# Patient Record
Sex: FEMALE | Race: WHITE | Employment: OTHER | URBAN - METROPOLITAN AREA
[De-identification: names, ages, dates, MRNs, and addresses within clinical notes are randomized per-mention and may not be internally consistent; named-entity substitution may affect disease eponyms.]

---

## 2019-07-16 LAB
CREATININE, EXTERNAL: 0.9
LDL-C, EXTERNAL: 157

## 2020-09-09 DIAGNOSIS — Z00.00 PREVENTATIVE HEALTH CARE: Primary | ICD-10-CM

## 2020-09-10 RX ORDER — THYROID, PORCINE 90 MG/1
TABLET ORAL
Qty: 30 TAB | Refills: 0 | Status: SHIPPED | OUTPATIENT
Start: 2020-09-10 | End: 2020-12-03

## 2020-09-10 RX ORDER — ROPINIROLE 3 MG/1
TABLET, FILM COATED ORAL
Qty: 30 TAB | Refills: 0 | Status: SHIPPED | OUTPATIENT
Start: 2020-09-10 | End: 2020-12-03

## 2020-09-22 VITALS
HEIGHT: 61 IN | SYSTOLIC BLOOD PRESSURE: 158 MMHG | TEMPERATURE: 98.4 F | WEIGHT: 186 LBS | DIASTOLIC BLOOD PRESSURE: 82 MMHG | OXYGEN SATURATION: 98 % | HEART RATE: 71 BPM | BODY MASS INDEX: 35.12 KG/M2 | RESPIRATION RATE: 12 BRPM

## 2020-09-22 PROBLEM — Z88.2 ALLERGY STATUS TO SULFONAMIDES STATUS: Status: ACTIVE | Noted: 2020-09-22

## 2020-09-22 PROBLEM — R06.02 SHORTNESS OF BREATH: Status: ACTIVE | Noted: 2020-09-22

## 2020-09-22 PROBLEM — R00.2 PALPITATIONS: Status: ACTIVE | Noted: 2020-09-22

## 2020-09-22 PROBLEM — K21.9 GASTRO-ESOPHAGEAL REFLUX DISEASE WITHOUT ESOPHAGITIS: Status: ACTIVE | Noted: 2020-09-22

## 2020-09-22 PROBLEM — R06.00 DYSPNEA, UNSPECIFIED: Status: ACTIVE | Noted: 2020-09-22

## 2020-09-22 PROBLEM — E07.9 DISORDER OF THYROID, UNSPECIFIED: Status: ACTIVE | Noted: 2020-09-22

## 2020-09-22 PROBLEM — I42.2 OTHER HYPERTROPHIC CARDIOMYOPATHY (HCC): Status: ACTIVE | Noted: 2020-09-22

## 2020-09-24 ENCOUNTER — VIRTUAL VISIT (OUTPATIENT)
Dept: FAMILY MEDICINE CLINIC | Age: 60
End: 2020-09-24
Payer: COMMERCIAL

## 2020-09-24 DIAGNOSIS — E78.2 MIXED HYPERLIPIDEMIA: ICD-10-CM

## 2020-09-24 DIAGNOSIS — E03.9 ACQUIRED HYPOTHYROIDISM: ICD-10-CM

## 2020-09-24 DIAGNOSIS — Z13.220 SCREENING FOR LIPOID DISORDERS: ICD-10-CM

## 2020-09-24 DIAGNOSIS — I10 ESSENTIAL HYPERTENSION: Primary | ICD-10-CM

## 2020-09-24 DIAGNOSIS — K21.9 GASTRO-ESOPHAGEAL REFLUX DISEASE WITHOUT ESOPHAGITIS: ICD-10-CM

## 2020-09-24 PROCEDURE — 99214 OFFICE O/P EST MOD 30 MIN: CPT | Performed by: FAMILY MEDICINE

## 2020-09-24 RX ORDER — PANTOPRAZOLE SODIUM 40 MG/1
TABLET, DELAYED RELEASE ORAL
Qty: 30 TAB | Refills: 1 | Status: SHIPPED | OUTPATIENT
Start: 2020-09-24 | End: 2020-12-03

## 2020-09-24 RX ORDER — ARMODAFINIL 150 MG/1
1 TABLET ORAL DAILY
COMMUNITY
End: 2022-10-17 | Stop reason: ALTCHOICE

## 2020-09-24 NOTE — PROGRESS NOTES
Consent: Octavio Armstrong, who was seen by synchronous (real-time) audio-video technology, and/or her healthcare decision maker, is aware that this patient-initiated, Telehealth encounter on 9/24/2020 is a billable service, with coverage as determined by her insurance carrier. She is aware that she may receive a bill and has provided verbal consent to proceed: YES-Consent obtained within past 12 months        712  Subjective:   Octavio Armstrong is a 64 y.o. female who was seen for Cholesterol Problem and Labs      She is following up today on hypertension, mixed hyperlipidemia, and hypothyroid histories. She reports taking her medications as directed. She continues to see the cardiologist every 6 months and states that her recent visit had a normal blood pressure and normal EKG. No medication changes were made. She also states that she has been following with Dr. Minor Graham for her fatigue and sleep issues. She use the CPAP machine for a few months but was not having any symptomatic improvement. He tested her for narcolepsy but that was negative. He then put her on armodafinil and she states that it made a tremendous difference in her energy levels. She will be seeing him again shortly. She also notes that the chest pain she has been having was negative for any cardiac work-up and her cardiologist told her to start Pepcid. She had stopped the Protonix at my encouragement to minimize risks of long-term use. Since starting the Pepcid she really has not noticed much change in the chest pain but does admit that she was having definite heartburn at night and it is helped a bit with that. Review of Systems   Constitutional: Negative for chills, fever and malaise/fatigue. Respiratory: Negative for cough, shortness of breath and wheezing. Cardiovascular: Positive for chest pain. Negative for palpitations and leg swelling. Gastrointestinal: Negative for diarrhea and vomiting.    Genitourinary: Negative for dysuria. Musculoskeletal: Negative for falls. Skin: Negative for rash. Neurological: Negative for dizziness, tingling and weakness. Psychiatric/Behavioral: Negative for depression. The patient is not nervous/anxious. Prior to Admission medications    Medication Sig Start Date End Date Taking? Authorizing Provider   Armodafinil 150 mg tab Take 1 Tab by mouth daily. Yes Provider, Historical   pantoprazole (Protonix) 40 mg tablet 1 tab po x 2-4 wks then prn 9/24/20  Yes MD Amanda Vinson Thyroid 90 mg tablet TAKE ONE TABLET BY MOUTH EVERY DAY IN THE MORNING 9/10/20  Yes Robert Aldrich MD   rOPINIRole (REQUIP) 3 mg tab tab TAKE One tablet by mouth at bedtime 9/10/20  Yes Robert Aldrich MD   metoprolol succinate (TOPROL-XL) 50 mg XL tablet Take  by mouth daily. Yes Provider, Historical   disopyramide CR (NORPACE CR) 100 mg CR capsule Take 2 Caps by mouth two (2) times a day. 10/2/13  Yes Reagna Dykes MD   Levocetirizine (XYZAL) 5 mg tablet Take  by mouth daily. Yes Provider, Historical   TOLTERODINE TARTRATE (DETROL PO) Take  by mouth. Yes Provider, Historical   losartan (COZAAR) 50 mg tablet Take 1 Tab by mouth daily. 11/1/13 9/24/20  Reagan Dykes MD   pantoprazole (PROTONIX) 40 mg tablet Take 40 mg by mouth daily. 9/24/20  Provider, Historical     Allergies   Allergen Reactions    Buspirone Other (comments)     Dizziness and fatigue    Sulfa (Sulfonamide Antibiotics) Itching     Patient Active Problem List    Diagnosis    Disorder of thyroid, unspecified    Gastro-esophageal reflux disease without esophagitis    Other hypertrophic cardiomyopathy (HCC)    Palpitations    Dyspnea, unspecified    Shortness of breath    Depressive disorder    Heart murmur    Acquired hypothyroidism    Mixed hyperlipidemia    Urinary incontinence, mixed    Restless legs    Obstructive sleep apnea     Intolerant of masks, had tried many      Obesity (BMI 30.0-34. 9)    Syncope and collapse     Syncope on 11/7/13. Witnessed by daughter.  Left ventricular outflow tract obstruction     MARYANNE of the anterior MV leaflet in the absence of significant septal hyptertrophy      Essential hypertension    Other dyspnea and respiratory abnormality       Objective:   Vital Signs: (As obtained by patient/caregiver at home)  There were no vitals taken for this visit. [INSTRUCTIONS:  \"[x]\" Indicates a positive item  \"[]\" Indicates a negative item  -- DELETE ALL ITEMS NOT EXAMINED]    Constitutional: [x] Appears well-developed and well-nourished [x] No apparent distress      [] Abnormal -     Mental status: [x] Alert and awake  [x] Oriented to person/place/time [x] Able to follow commands    [] Abnormal -     Eyes:   EOM    [x]  Normal    [] Abnormal -   Sclera  [x]  Normal    [] Abnormal -          Discharge [x]  None visible   [] Abnormal -     HENT: [x] Normocephalic, atraumatic  [] Abnormal -   [x] Mouth/Throat: Mucous membranes are moist    External Ears [x] Normal  [] Abnormal -    Neck: [x] No visualized mass [] Abnormal -     Pulmonary/Chest: [x] Respiratory effort normal   [x] No visualized signs of difficulty breathing or respiratory distress        [] Abnormal -        Neurological:        [x] No Facial Asymmetry (Cranial nerve 7 motor function) (limited exam due to video visit)          [x] No gaze palsy        [] Abnormal -          Skin:        [x] No significant exanthematous lesions or discoloration noted on facial skin         [] Abnormal -            Psychiatric:       [x] Normal Affect [] Abnormal -        [x] No Hallucinations    Other pertinent observable physical exam findings:-              Assessment & Plan:   Diagnoses and all orders for this visit:    1. Essential hypertension  Patient reports normal blood pressure readings both at home and at her recent cardiology visit. No medication change.     2. Mixed hyperlipidemia  -     LIPID PANEL  -     METABOLIC PANEL, COMPREHENSIVE  -     CBC WITH AUTOMATED DIFF  Patient is currently not on medication for the cholesterol levels. She does report that she recently came off vacation but is starting to work more on healthy lifestyle again. We are checking annual levels. 3. Acquired hypothyroidism  -     THYROID CASCADE PROFILE  Patient reports taking medication daily as directed. We are checking annual level with labs. 4. Gastro-esophageal reflux disease without esophagitis  I am going to put her back on her Protonix for the next 2 to 4 weeks to see if that resolves this obscure chest pain she has been having that has had negative cardiac work-up. We reviewed coming back off of the medicine onto Pepcid if things go well and trying to then use the Protonix only on an as-needed basis. 5. Screening for lipoid disorders    Other orders  -     pantoprazole (Protonix) 40 mg tablet; 1 tab po x 2-4 wks then prn          Follow-up and Dispositions    · Return in about 6 months (around 3/24/2021) for wellness. non fasting. We discussed the expected course, resolution and complications of the diagnosis(es) in detail. Medication risks, benefits, costs, interactions, and alternatives were discussed as indicated. I advised her to contact the office if her condition worsens, changes or fails to improve as anticipated. She expressed understanding with the diagnosis(es) and plan. Rashad Domingo is a 64 y.o. female being evaluated by a video visit encounter for concerns as above. A caregiver was present when appropriate. Due to this being a TeleHealth encounter (During ZKEHU-76 public health emergency), evaluation of the following organ systems was limited: Vitals/Constitutional/EENT/Resp/CV/GI//MS/Neuro/Skin/Heme-Lymph-Imm.   Pursuant to the emergency declaration under the [facility]5 waiver authority and the [employer] Appropriations Act, this Virtual  Visit was conducted, with patient's (and/or legal guardian's) consent, to reduce the patient's risk of exposure to COVID-19 and provide necessary medical care. Services were provided through a video synchronous discussion virtually to substitute for in-person clinic visit. Patient and provider were located at their individual homes.         Joanie Coronado MD

## 2020-09-30 LAB
ALBUMIN SERPL-MCNC: 4.8 G/DL (ref 3.8–4.9)
ALBUMIN/GLOB SERPL: 1.9 {RATIO} (ref 1.2–2.2)
ALP SERPL-CCNC: 80 IU/L (ref 39–117)
ALT SERPL-CCNC: 24 IU/L (ref 0–32)
AST SERPL-CCNC: 17 IU/L (ref 0–40)
BASOPHILS # BLD AUTO: 0.1 X10E3/UL (ref 0–0.2)
BASOPHILS NFR BLD AUTO: 1 %
BILIRUB SERPL-MCNC: 0.2 MG/DL (ref 0–1.2)
BUN SERPL-MCNC: 21 MG/DL (ref 6–24)
BUN/CREAT SERPL: 24 (ref 9–23)
CALCIUM SERPL-MCNC: 10 MG/DL (ref 8.7–10.2)
CHLORIDE SERPL-SCNC: 100 MMOL/L (ref 96–106)
CHOLEST SERPL-MCNC: 230 MG/DL (ref 100–199)
CO2 SERPL-SCNC: 25 MMOL/L (ref 20–29)
CREAT SERPL-MCNC: 0.88 MG/DL (ref 0.57–1)
EOSINOPHIL # BLD AUTO: 0.1 X10E3/UL (ref 0–0.4)
EOSINOPHIL NFR BLD AUTO: 2 %
ERYTHROCYTE [DISTWIDTH] IN BLOOD BY AUTOMATED COUNT: 13.1 % (ref 11.7–15.4)
GLOBULIN SER CALC-MCNC: 2.5 G/DL (ref 1.5–4.5)
GLUCOSE SERPL-MCNC: 98 MG/DL (ref 65–99)
HCT VFR BLD AUTO: 38.9 % (ref 34–46.6)
HDLC SERPL-MCNC: 53 MG/DL
HGB BLD-MCNC: 13.3 G/DL (ref 11.1–15.9)
IMM GRANULOCYTES # BLD AUTO: 0 X10E3/UL (ref 0–0.1)
IMM GRANULOCYTES NFR BLD AUTO: 1 %
LDLC SERPL CALC-MCNC: 128 MG/DL (ref 0–99)
LYMPHOCYTES # BLD AUTO: 2.2 X10E3/UL (ref 0.7–3.1)
LYMPHOCYTES NFR BLD AUTO: 40 %
MCH RBC QN AUTO: 30.6 PG (ref 26.6–33)
MCHC RBC AUTO-ENTMCNC: 34.2 G/DL (ref 31.5–35.7)
MCV RBC AUTO: 89 FL (ref 79–97)
MONOCYTES # BLD AUTO: 0.4 X10E3/UL (ref 0.1–0.9)
MONOCYTES NFR BLD AUTO: 7 %
NEUTROPHILS # BLD AUTO: 2.8 X10E3/UL (ref 1.4–7)
NEUTROPHILS NFR BLD AUTO: 49 %
PLATELET # BLD AUTO: 276 X10E3/UL (ref 150–450)
POTASSIUM SERPL-SCNC: 4.8 MMOL/L (ref 3.5–5.2)
PROT SERPL-MCNC: 7.3 G/DL (ref 6–8.5)
RBC # BLD AUTO: 4.35 X10E6/UL (ref 3.77–5.28)
SODIUM SERPL-SCNC: 138 MMOL/L (ref 134–144)
TRIGL SERPL-MCNC: 278 MG/DL (ref 0–149)
TSH SERPL DL<=0.005 MIU/L-ACNC: 4.24 UIU/ML (ref 0.45–4.5)
VLDLC SERPL CALC-MCNC: 49 MG/DL (ref 5–40)
WBC # BLD AUTO: 5.6 X10E3/UL (ref 3.4–10.8)

## 2020-10-21 ENCOUNTER — DOCUMENTATION ONLY (OUTPATIENT)
Dept: FAMILY MEDICINE CLINIC | Age: 60
End: 2020-10-21

## 2020-10-27 ENCOUNTER — TELEPHONE (OUTPATIENT)
Dept: FAMILY MEDICINE CLINIC | Age: 60
End: 2020-10-27

## 2020-10-27 NOTE — TELEPHONE ENCOUNTER
Patient called to f/u to see if the form she dropped off for [employer] has been signed and faxed yet, she has not received a call.

## 2020-12-07 RX ORDER — METOPROLOL SUCCINATE 50 MG/1
50 TABLET, EXTENDED RELEASE ORAL DAILY
Qty: 90 TAB | Refills: 1 | Status: SHIPPED | OUTPATIENT
Start: 2020-12-07 | End: 2022-10-17 | Stop reason: SDUPTHER

## 2021-03-29 ENCOUNTER — OFFICE VISIT (OUTPATIENT)
Dept: FAMILY MEDICINE CLINIC | Age: 61
End: 2021-03-29
Payer: COMMERCIAL

## 2021-03-29 VITALS
HEIGHT: 61 IN | WEIGHT: 178 LBS | BODY MASS INDEX: 33.61 KG/M2 | SYSTOLIC BLOOD PRESSURE: 126 MMHG | OXYGEN SATURATION: 96 % | DIASTOLIC BLOOD PRESSURE: 82 MMHG | HEART RATE: 72 BPM | TEMPERATURE: 96.9 F

## 2021-03-29 DIAGNOSIS — Z00.00 WELLNESS EXAMINATION: Primary | ICD-10-CM

## 2021-03-29 DIAGNOSIS — E66.09 CLASS 1 OBESITY DUE TO EXCESS CALORIES WITH SERIOUS COMORBIDITY AND BODY MASS INDEX (BMI) OF 34.0 TO 34.9 IN ADULT: ICD-10-CM

## 2021-03-29 DIAGNOSIS — I10 ESSENTIAL HYPERTENSION: ICD-10-CM

## 2021-03-29 PROCEDURE — 99396 PREV VISIT EST AGE 40-64: CPT | Performed by: FAMILY MEDICINE

## 2021-03-29 NOTE — PROGRESS NOTES
Subjective  Chief Complaint   Patient presents with    Annual Wellness Visit     HPI:  Adiel Avila is a 63 y.o. female. Adiel Avila is on the schedule today for annual wellness exam and is also due for follow-up of chronic issues. For the wellness:   Flu- Recommended every fall  Tetanus- Tdap 2013  Shingrix- series completed  Pneumovax 23-  N/A  Prevnar 15- at age 74  HCV screening- complete  Pap- 3/18/2021  Mammo- 5/12/2020  Dexa- at age 74  Colon cancer screening- colonoscopy 2014  Smoking status- never  Low dose CT scan- N/A  PHQ2 Score = 0  Exercise- walking    For the acute/chronic issues:  She is also due for 6-month follow-up on hypertension. She reports taking medication as directed. Home blood pressures at goal per pt.        Past Medical History:   Diagnosis Date    Acquired hypothyroidism     Cancer ([facility] [address]     skin    Depressive disorder     Essential hypertension     Eye problems     Heart murmur     Mixed hyperlipidemia     Restless legs     Thyroid disease     Urinary incontinence, mixed      Family History   Problem Relation Age of Onset    Heart Surgery Father         CABG x3    Hypertension Father     High Cholesterol Father     Heart Disease Father     Bipolar Disorder Father     Coronary Artery Disease Paternal Grandfather     Heart Disease Paternal Grandfather     High Cholesterol Sister     Hypertension Sister     Depression Mother     Cancer Mother         lung    Diabetes Maternal Grandmother     Heart Disease Maternal Grandfather      Social History     Socioeconomic History    Marital status:      Spouse name: Not on file    Number of children: Not on file    Years of education: Not on file    Highest education level: Not on file   Occupational History    Not on file   Social Needs    Financial resource strain: Not on file    Food insecurity     Worry: Not on file     Inability: Not on file    Transportation needs     Medical: Not on file Non-medical: Not on file   Tobacco Use    Smoking status: Never Smoker    Smokeless tobacco: Never Used   Substance and Sexual Activity    Alcohol use: No    Drug use: Never    Sexual activity: Not on file   Lifestyle    Physical activity     Days per week: Not on file     Minutes per session: Not on file    Stress: Not on file   Relationships    Social connections     Talks on phone: Not on file     Gets together: Not on file     Attends [employer] service: Not on file     Active member of club or organization: Not on file     Attends meetings of clubs or organizations: Not on file     Relationship status: Not on file    Intimate partner violence     Fear of current or ex partner: Not on file     Emotionally abused: Not on file     Physically abused: Not on file     Forced sexual activity: Not on file   Other Topics Concern    Not on file   Social History Narrative    Not on file     Current Outpatient Medications on File Prior to Visit   Medication Sig Dispense Refill    mirabegron (MYRBETRIQ PO) Take  by mouth. 1 tablet po daily -patient trying medication samples to see if it helps      pantoprazole (PROTONIX) 40 mg tablet TAKE ONE TABLET BY MOUTH DAILY FOR 2 TO 4 WEEKS THEN TAKE ONLY AS NEEDED 90 Tab 0    metoprolol succinate (TOPROL-XL) 50 mg XL tablet Take 1 Tab by mouth daily. 90 Tab 1    FLUoxetine (PROzac) 20 mg capsule Take 1 capsule(s) every day by oral route. 90 Cap 3    [place] Thyroid 90 mg tablet TAKE ONE TABLET BY MOUTH EVERY DAY IN THE MORNING 90 Tab 3    rOPINIRole (REQUIP) 3 mg tab tab TAKE One tablet by mouth at bedtime 90 Tab 3    Armodafinil 150 mg tab Take 1 Tab by mouth daily.  disopyramide CR (NORPACE CR) 100 mg CR capsule Take 2 Caps by mouth two (2) times a day. 120 Cap 3    Levocetirizine (XYZAL) 5 mg tablet Take  by mouth daily.  TOLTERODINE TARTRATE (DETROL PO) Take  by mouth. No current facility-administered medications on file prior to visit. Allergies   Allergen Reactions    Buspirone Other (comments)     Dizziness and fatigue    Sulfa (Sulfonamide Antibiotics) Itching     Review of Systems   Constitutional: Negative for chills, fever and malaise/fatigue. Respiratory: Negative for cough, shortness of breath and wheezing. Cardiovascular: Negative for chest pain, palpitations and leg swelling. Gastrointestinal: Negative for diarrhea and vomiting. Genitourinary: Negative for dysuria. Neurological: Negative for dizziness, tingling and weakness. Psychiatric/Behavioral: Negative for depression. The patient is not nervous/anxious. Objective  Visit Vitals  /82 (BP 1 Location: Left upper arm, BP Patient Position: At rest, BP Cuff Size: Adult)   Pulse 72   Temp 96.9 °F (36.1 °C) (Temporal)   Ht 5' 0.5\" (1.537 m)   Wt 178 lb (80.7 kg)   SpO2 96%   BMI 34.19 kg/m²     Physical Exam  Constitutional:       General: She is not in acute distress. Appearance: Normal appearance. She is obese. HENT:      Head: Normocephalic and atraumatic. Neck:      Musculoskeletal: Neck supple. No muscular tenderness. Thyroid: No thyroid mass or thyromegaly. Cardiovascular:      Rate and Rhythm: Normal rate and regular rhythm. Heart sounds: Murmur present. Pulmonary:      Effort: Pulmonary effort is normal. No respiratory distress. Breath sounds: Normal breath sounds. No wheezing. Musculoskeletal:      Right lower leg: No edema. Left lower leg: No edema. Lymphadenopathy:      Cervical: No cervical adenopathy. Skin:     General: Skin is warm and dry. Neurological:      Mental Status: She is alert and oriented to person, place, and time. Mental status is at baseline. Psychiatric:         Attention and Perception: Attention and perception normal.         Mood and Affect: Mood and affect normal.         Speech: Speech normal.         Behavior: Behavior normal.          Assessment & Plan    ICD-10-CM ICD-9-CM    1. Wellness examination  Z00.00 V70.0    2. Essential hypertension  I10 401.9    3. Class 1 obesity due to excess calories with serious comorbidity and body mass index (BMI) of 34.0 to 34.9 in adult  E66.09 278.00     Z68.34 V85.34      Diagnoses and all orders for this visit:    1. Wellness examination  We are getting patient up to date on recommended preventative measures as noted. She has an order for her mammogram this spring. We will try and get the results from Dr. Gisselle Gan for the Pap smear. She will be scheduling for follow-up colonoscopy. 2. Essential hypertension  Blood pressure reading is at goal both here and at home. Continue current dose of medication. 3. Class 1 obesity due to excess calories with serious comorbidity and body mass index (BMI) of 34.0 to 34.9 in adult  I encourage increasing activity and striving for a diet rich in vegetables and lean proteins. Follow-up and Dispositions    · Return in about 6 months (around 9/29/2021) for f/u chronic issues, fasting.        Minor Henderson MD

## 2021-03-29 NOTE — PROGRESS NOTES
Chief Complaint   Patient presents with   t Annual Wellness Visit   1. Have you been to the ER, urgent care clinic since your last visit? Hospitalized since your last visit? No    2. Have you seen or consulted any other health care providers outside of the [address] [place] [address] since your last visit? Include any pap smears or colon screening. Yes, patient was seen by [facility] on 3/2021 for left knee pain and given an injection.  Also had papsmear on 3/18/2021 with GYN

## 2021-03-29 NOTE — PATIENT INSTRUCTIONS
Hyperlipidemia: After Your Visit Your Care Instructions Hyperlipidemia is too much fat in your blood. The body has several kinds of fat, including cholesterol and triglycerides. Your body needs fat for many things, such as making new cells. But too much fat in your blood increases your chances of having a heart attack or stroke. You may be able to lower your cholesterol and triglycerides with a heart-healthy diet, exercise, and if needed, medicine. Your doctor may want you to try lifestyle changes first to see whether they lower the fat in your blood. You may need to take medicine if lifestyle changes do not lower the fat in your blood enough. Follow-up care is a key part of your treatment and safety. Be sure to make and go to all appointments, and call your doctor if you are having problems. Its also a good idea to know your test results and keep a list of the medicines you take. How can you care for yourself at home? Take your medicines · Take your medicines exactly as prescribed. Call your doctor if you think you are having a problem with your medicine. · If you take medicine to lower your cholesterol, go to follow-up visits. You will need to have blood tests. · Do not take large doses of niacin, which is a B vitamin, while taking medicine called statins. It may increase the chance of muscle pain and liver problems. · Talk to your doctor about avoiding grapefruit juice if you are taking statins. Grapefruit juice can raise the level of this medicine in your blood. This could increase side effects. Eat more fruits, vegetables, and fiber · Fruits and vegetables have lots of nutrients that help protect against heart disease, and they have littleif anyfat. Try to eat at least five servings a day. Dark green, deep orange, or yellow fruits and vegetables are healthy choices. · Keep carrots, celery, and other veggies handy for snacks.  Buy fruit that is in season and store it where you can see it so that you will be tempted to eat it. Cook dishes that have a lot of veggies in them, such as stir-fries and soups. · Foods high in fiber may reduce your cholesterol and provide important vitamins and minerals. High-fiber foods include whole-grain cereals and breads, oatmeal, beans, brown rice, citrus fruits, and apples. · Buy whole-grain breads and cereals instead of white bread and pastries. Limit saturated fat · Read food labels and try to avoid saturated fat and trans fat. They increase your risk of heart disease. · Use olive or canola oil when you cook. Try cholesterol-lowering spreads, such as Benecol or Take Control. · Bake, broil, grill, or steam foods instead of frying them. · Limit the amount of high-fat meats you eat, including hot dogs and sausages. Cut out all visible fat when you prepare meat. · Eat fish, skinless poultry, and soy products such as tofu instead of high-fat meats. Soybeans may be especially good for your heart. Eat at least two servings of fish a week. Certain fish, such as salmon, contain omega-3 fatty acids, which may help reduce your risk of heart attack. · Choose low-fat or fat-free milk and dairy products. Get exercise, limit alcohol, and quit smoking · Get more exercise. Work with your doctor to set up an exercise program. Even if you can do only a small amount, exercise will help you get stronger, have more energy, and manage your weight and your stress. Walking is an easy way to get exercise. Gradually increase the amount you walk every day. Aim for at least 30 minutes on most days of the week. You also may want to swim, bike, or do other activities. · Limit alcohol to no more than 2 drinks a day for men and 1 drink a day for women. · Do not smoke. If you need help quitting, talk to your doctor about stop-smoking programs and medicines. These can increase your chances of quitting for good. When should you call for help?  
Call 911 anytime you think you may need emergency care. For example, call if: 
· You have symptoms of a heart attack. These may include: ¨ Chest pain or pressure, or a strange feeling in the chest. 
¨ Sweating. ¨ Shortness of breath. ¨ Nausea or vomiting. ¨ Pain, pressure, or a strange feeling in the back, neck, jaw, or upper belly or in one or both shoulders or arms. ¨ Lightheadedness or sudden weakness. ¨ A fast or irregular heartbeat. After you call 911, the  may tell you to chew 1 adult-strength or 2 to 4 low-dose aspirin. Wait for an ambulance. Do not try to drive yourself. · You have signs of a stroke. These may include: 
¨ Sudden numbness, paralysis, or weakness in your face, arm, or leg, especially on only one side of your body. ¨ New problems with walking or balance. ¨ Sudden vision changes. ¨ Drooling or slurred speech. ¨ New problems speaking or understanding simple statements, or feeling confused. ¨ A sudden, severe headache that is different from past headaches. · You passed out (lost consciousness). Call your doctor now or seek immediate medical care if: 
· You have muscle pain or weakness. Watch closely for changes in your health, and be sure to contact your doctor if: 
· You are very tired. · You have an upset stomach, gas, constipation, or belly pain or cramps. Where can you learn more? Go to [employer] Enter C406 in the search box to learn more about \"Hyperlipidemia: After Your Visit. \"  
© 5907-1648 [employer]. Care instructions adapted under license by [employer] (which disclaims liability or warranty for this information). This care instruction is for use with your licensed healthcare professional. If you have questions about a medical condition or this instruction, always ask your healthcare professional. [place] [address] any warranty or liability for your use of this information. Content Version: 4.3.086589; Last Revised: October 13, 2011

## 2021-04-28 ENCOUNTER — OFFICE VISIT (OUTPATIENT)
Dept: FAMILY MEDICINE CLINIC | Age: 61
End: 2021-04-28
Payer: COMMERCIAL

## 2021-04-28 VITALS
HEIGHT: 61 IN | TEMPERATURE: 97.6 F | BODY MASS INDEX: 34.17 KG/M2 | HEART RATE: 68 BPM | DIASTOLIC BLOOD PRESSURE: 90 MMHG | SYSTOLIC BLOOD PRESSURE: 150 MMHG | WEIGHT: 181 LBS | OXYGEN SATURATION: 96 %

## 2021-04-28 DIAGNOSIS — I10 ESSENTIAL HYPERTENSION: ICD-10-CM
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
## 2021-04-28 DIAGNOSIS — R51.9 ACUTE NONINTRACTABLE HEADACHE, UNSPECIFIED HEADACHE TYPE: ICD-10-CM

## 2021-04-28 PROCEDURE — 99214 OFFICE O/P EST MOD 30 MIN: CPT | Performed by: FAMILY MEDICINE

## 2021-04-28 NOTE — PROGRESS NOTES
Subjective  Chief Complaint   Patient presents with   Amparo Ferrari     patient was seen by [facility] med on 4/9/21 for tick bite on left leg it was warm to touch bulleye and bump, she was given doxycycline to treat but they couldnt do lymes testing there, while taking antibiotic see pulled another tick off of her. she has been waking up with a headache almost everyday since the tick bite      HPI:  Mele Lion is a 63 y.o. female. Original tick bite on 4/9/2021 as noted on intake. It was in left groin for <24 hours and not engorged upon removal.  She was treated with doxycycline x 10 days. About 3 days later she found another tick on her right thigh. Not sure how long it was there for but not engorged upon removal.    Around 4/19 she started with a HA. She wasn't sure it that was related to all of this or to having 2nd COVID vaccine on 4/16/21. No h/o similar HA. It is across forehead. Denies congestion, sneezing, eyes itching, etc.  Excedrin helps. HA is not waking her up at night but is there when she wakes up and lasts all day if not using Excedrin. No vision change, nausea, photophobia, phonophobia. She also notes that her blood pressure has been a bit off lately. She is wondering if starting Myrbetriq over the last few weeks could be the cause. She had been on Detrol previously but her urologist switched her due to some persistent symptoms. Objective  Visit Vitals  BP (!) 150/90 (BP 1 Location: Left upper arm, BP Patient Position: At rest, BP Cuff Size: Adult)   Pulse 68   Temp 97.6 °F (36.4 °C) (Temporal)   Ht 5' 0.5\" (1.537 m)   Wt 181 lb (82.1 kg)   SpO2 96%   BMI 34.77 kg/m²     Physical Exam  Constitutional:       General: She is not in acute distress. Appearance: Normal appearance. She is obese. HENT:      Head: Normocephalic and atraumatic. Eyes:      Extraocular Movements:      Right eye: Normal extraocular motion and no nystagmus.       Left eye: Normal extraocular motion and no nystagmus. Neck:      Musculoskeletal: Neck supple. No muscular tenderness. Thyroid: No thyroid mass or thyromegaly. Cardiovascular:      Rate and Rhythm: Normal rate and regular rhythm. Heart sounds: No murmur. Pulmonary:      Effort: Pulmonary effort is normal. No respiratory distress. Breath sounds: Normal breath sounds. No wheezing. Musculoskeletal:      Right lower leg: No edema. Left lower leg: No edema. Lymphadenopathy:      Cervical: No cervical adenopathy. Skin:     General: Skin is warm and dry. Neurological:      Mental Status: She is alert and oriented to person, place, and time. Mental status is at baseline. Cranial Nerves: Cranial nerves are intact. No cranial nerve deficit or facial asymmetry. Sensory: Sensation is intact. Motor: Motor function is intact. No weakness, tremor, abnormal muscle tone or pronator drift. Coordination: Romberg sign negative. Coordination normal. Finger-Nose-Finger Test and Heel to [facility] Test normal.      Gait: Gait is intact. Gait and tandem walk normal.      Deep Tendon Reflexes:      Reflex Scores:       Patellar reflexes are 2+ on the right side and 2+ on the left side. Psychiatric:         Attention and Perception: Attention and perception normal.         Mood and Affect: Mood and affect normal. Mood is not anxious or depressed. Speech: Speech normal.         Behavior: Behavior normal.          Assessment & Plan      ICD-10-CM ICD-9-CM    1. Tick bite, initial encounter  W57. XXXA 919.4      E906.4    2. Essential hypertension  I10 401.9    3. Acute nonintractable headache, unspecified headache type  R51.9 784.0    4. Gastro-esophageal reflux disease without esophagitis  K21.9 530.81      Diagnoses and all orders for this visit:    1. Tick bite, initial encounter  I reviewed with patient that there is just no way that a tick could have conferred Lyme disease  given the details of the history.  Even if it had she was on more than ample dose of doxycycline to eradicate occurrence. 2. Essential hypertension  Patient has multiple reasons why her blood pressure may be mildly elevated. Certainly the Myrbetriq is high on this list and we will be holding that for the next 2 weeks and watching. It also could be the Excedrin from the standpoint of both the NSAID and the caffeine. She would be holding these medications and checking home levels. 3. Acute nonintractable headache, unspecified headache type  Is very possible that the Covid vaccination, doxycycline course, anxiety over the possible Lyme disease, etc. all contributed to this headache but it is also very possible that this may be a side effect of the Myrbetriq as well. She is going to go back to the [facility] for the next 2 weeks and see if symptoms resolve. If they do she can try the Myrbetriq again to see if she tolerates it and will let me know if headache persists even through the trial of medication. On this date 4/28/2021 I have spent 30 minutes reviewing previous notes, test results and face to face with the patient discussing the diagnosis and treatment plan.         Rohith Radford MD

## 2021-04-28 NOTE — PROGRESS NOTES
Chief Complaint   Patient presents with   Amparo Ferrari     patient was seen by [facility] med on 4/9/21 for tick bite on left leg it was warm to touch bulleye and bump, she was given doxycycline to treat but they couldnt do lymes testing there, while taking antibiotic see pulled another tick off of her. she has been waking up with a headache almost everyday since the tick bite    1. Have you been to the ER, urgent care clinic since your last visit? Hospitalized since your last visit? Yes, patient was seen at [facility] for Tick bite on 4/9/2021    2. Have you seen or consulted any other health care providers outside of the [address] [place] [address] since your last visit? Include any pap smears or colon screening.  Yes patient was seen at dentist on 4/1/2021 and noted her bp was elevated and also had a papsmear which was normal with gyn on 03/18/2021

## 2021-10-04 ENCOUNTER — OFFICE VISIT (OUTPATIENT)
Dept: FAMILY MEDICINE CLINIC | Age: 61
End: 2021-10-04
Payer: COMMERCIAL

## 2021-10-04 VITALS
SYSTOLIC BLOOD PRESSURE: 116 MMHG | HEIGHT: 61 IN | TEMPERATURE: 97.2 F | BODY MASS INDEX: 33.04 KG/M2 | OXYGEN SATURATION: 96 % | WEIGHT: 175 LBS | DIASTOLIC BLOOD PRESSURE: 70 MMHG | HEART RATE: 62 BPM

## 2021-10-04 DIAGNOSIS — E78.2 MIXED HYPERLIPIDEMIA: ICD-10-CM

## 2021-10-04 DIAGNOSIS — Z23 ENCOUNTER FOR IMMUNIZATION: ICD-10-CM

## 2021-10-04 DIAGNOSIS — Z13.220 SCREENING FOR LIPOID DISORDERS: ICD-10-CM

## 2021-10-04 DIAGNOSIS — E03.9 ACQUIRED HYPOTHYROIDISM: ICD-10-CM

## 2021-10-04 DIAGNOSIS — I10 ESSENTIAL HYPERTENSION: Primary | ICD-10-CM

## 2021-10-04 DIAGNOSIS — E66.09 CLASS 1 OBESITY DUE TO EXCESS CALORIES WITH SERIOUS COMORBIDITY AND BODY MASS INDEX (BMI) OF 33.0 TO 33.9 IN ADULT: ICD-10-CM

## 2021-10-04 DIAGNOSIS — K12.0 CANKER SORES ORAL: ICD-10-CM

## 2021-10-04 PROCEDURE — 90471 IMMUNIZATION ADMIN: CPT | Performed by: FAMILY MEDICINE

## 2021-10-04 PROCEDURE — 99214 OFFICE O/P EST MOD 30 MIN: CPT | Performed by: FAMILY MEDICINE

## 2021-10-04 PROCEDURE — 90674 CCIIV4 VAC NO PRSV 0.5 ML IM: CPT | Performed by: FAMILY MEDICINE

## 2021-10-04 RX ORDER — TRIAMCINOLONE ACETONIDE 1 MG/G
PASTE DENTAL 2 TIMES DAILY
Qty: 5 G | Refills: 2 | Status: SHIPPED | OUTPATIENT
Start: 2021-10-04 | End: 2022-10-17 | Stop reason: ALTCHOICE

## 2021-10-04 NOTE — PATIENT INSTRUCTIONS
Hyperlipidemia: After Your Visit  Your Care Instructions  Hyperlipidemia is too much fat in your blood. The body has several kinds of fat, including cholesterol and triglycerides. Your body needs fat for many things, such as making new cells. But too much fat in your blood increases your chances of having a heart attack or stroke. You may be able to lower your cholesterol and triglycerides with a heart-healthy diet, exercise, and if needed, medicine. Your doctor may want you to try lifestyle changes first to see whether they lower the fat in your blood. You may need to take medicine if lifestyle changes do not lower the fat in your blood enough. Follow-up care is a key part of your treatment and safety. Be sure to make and go to all appointments, and call your doctor if you are having problems. Its also a good idea to know your test results and keep a list of the medicines you take. How can you care for yourself at home? Take your medicines  · Take your medicines exactly as prescribed. Call your doctor if you think you are having a problem with your medicine. · If you take medicine to lower your cholesterol, go to follow-up visits. You will need to have blood tests. · Do not take large doses of niacin, which is a B vitamin, while taking medicine called statins. It may increase the chance of muscle pain and liver problems. · Talk to your doctor about avoiding grapefruit juice if you are taking statins. Grapefruit juice can raise the level of this medicine in your blood. This could increase side effects. Eat more fruits, vegetables, and fiber  · Fruits and vegetables have lots of nutrients that help protect against heart disease, and they have little--if any--fat. Try to eat at least five servings a day. Dark green, deep orange, or yellow fruits and vegetables are healthy choices. · Keep carrots, celery, and other veggies handy for snacks.  Buy fruit that is in season and store it where you can see it so that you will be tempted to eat it. Cook dishes that have a lot of veggies in them, such as stir-fries and soups. · Foods high in fiber may reduce your cholesterol and provide important vitamins and minerals. High-fiber foods include whole-grain cereals and breads, oatmeal, beans, brown rice, citrus fruits, and apples. · Buy whole-grain breads and cereals instead of white bread and pastries. Limit saturated fat  · Read food labels and try to avoid saturated fat and trans fat. They increase your risk of heart disease. · Use olive or canola oil when you cook. Try cholesterol-lowering spreads, such as Benecol or Take Control. · Bake, broil, grill, or steam foods instead of frying them. · Limit the amount of high-fat meats you eat, including hot dogs and sausages. Cut out all visible fat when you prepare meat. · Eat fish, skinless poultry, and soy products such as tofu instead of high-fat meats. Soybeans may be especially good for your heart. Eat at least two servings of fish a week. Certain fish, such as salmon, contain omega-3 fatty acids, which may help reduce your risk of heart attack. · Choose low-fat or fat-free milk and dairy products. Get exercise, limit alcohol, and quit smoking  · Get more exercise. Work with your doctor to set up an exercise program. Even if you can do only a small amount, exercise will help you get stronger, have more energy, and manage your weight and your stress. Walking is an easy way to get exercise. Gradually increase the amount you walk every day. Aim for at least 30 minutes on most days of the week. You also may want to swim, bike, or do other activities. · Limit alcohol to no more than 2 drinks a day for men and 1 drink a day for women. · Do not smoke. If you need help quitting, talk to your doctor about stop-smoking programs and medicines. These can increase your chances of quitting for good. When should you call for help?   Call 911 anytime you think you may need emergency care. For example, call if:  · You have symptoms of a heart attack. These may include:  ¨ Chest pain or pressure, or a strange feeling in the chest.  ¨ Sweating. ¨ Shortness of breath. ¨ Nausea or vomiting. ¨ Pain, pressure, or a strange feeling in the back, neck, jaw, or upper belly or in one or both shoulders or arms. ¨ Lightheadedness or sudden weakness. ¨ A fast or irregular heartbeat. After you call 911, the  may tell you to chew 1 adult-strength or 2 to 4 low-dose aspirin. Wait for an ambulance. Do not try to drive yourself. · You have signs of a stroke. These may include:  ¨ Sudden numbness, paralysis, or weakness in your face, arm, or leg, especially on only one side of your body. ¨ New problems with walking or balance. ¨ Sudden vision changes. ¨ Drooling or slurred speech. ¨ New problems speaking or understanding simple statements, or feeling confused. ¨ A sudden, severe headache that is different from past headaches. · You passed out (lost consciousness). Call your doctor now or seek immediate medical care if:  · You have muscle pain or weakness. Watch closely for changes in your health, and be sure to contact your doctor if:  · You are very tired. · You have an upset stomach, gas, constipation, or belly pain or cramps. Where can you learn more? Go to [employer]  Enter C406 in the search box to learn more about \"Hyperlipidemia: After Your Visit. \"   © 5557-2403 Healthwise, [employer]orporated. Care instructions adapted under license by [employer] (which disclaims liability or warranty for this information). This care instruction is for use with your licensed healthcare professional. If you have questions about a medical condition or this instruction, always ask your healthcare professional. [place] [address] any warranty or liability for your use of this information.   Content Version: 4.6.872659; Last Revised: October 13, 2011

## 2021-10-04 NOTE — Clinical Note
NOTIFICATION RETURN TO WORK / SCHOOL    10/4/2021 8:21 AM    Ms. [address] [address]      To Whom It May Concern:    Andreas Ga is currently under the care of [address] [place] [address]. She will return to work/school on: ***    If there are questions or concerns please have the patient contact our office.         Sincerely,      Artsherif Meade MD

## 2021-10-04 NOTE — PROGRESS NOTES
Subjective  Chief Complaint   Patient presents with    Follow Up Chronic Condition     HPI:  Tamiko Lopez is a 63 y.o. female. She is following up today on hypertension, hyperlipidemia, and hypothyroidism. She reports taking medications as directed. Home blood pressure readings are typically ~120/76. Her only acute complaint today is that for the last month she has had some sores on her tongue that healed but new ones come up. She does note that she has recently changed her toothpaste and also has started using more dissolvable zinc tabs for infectious disease protection. No increased stress recently or other concerns.     Past Medical History:   Diagnosis Date    Acquired hypothyroidism     Cancer ([facility] [address]     skin    Depressive disorder     Essential hypertension     Eye problems     Heart murmur     Mixed hyperlipidemia     Restless legs     Thyroid disease     Urinary incontinence, mixed      Family History   Problem Relation Age of Onset    Heart Surgery Father         CABG x3    Hypertension Father     High Cholesterol Father     Heart Disease Father     Bipolar Disorder Father     Coronary Artery Disease Paternal Grandfather     Heart Disease Paternal Grandfather     High Cholesterol Sister     Hypertension Sister     Depression Mother     Cancer Mother         lung    Diabetes Maternal Grandmother     Heart Disease Maternal Grandfather      Social History     Socioeconomic History    Marital status:      Spouse name: Not on file    Number of children: Not on file    Years of education: Not on file    Highest education level: Not on file   Occupational History    Not on file   Tobacco Use    Smoking status: Never Smoker    Smokeless tobacco: Never Used   Vaping Use    Vaping Use: Never used   Substance and Sexual Activity    Alcohol use: No    Drug use: Never    Sexual activity: Not on file   Other Topics Concern    Not on file   Social History Narrative    Not on file     Social Determinants of Health     Financial Resource Strain:     Difficulty of Paying Living Expenses:    Food Insecurity:     Worried About Running Out of Food in the Last Year:     [address] in the Last Year:    Transportation Needs:     Lack of Transportation (Medical):  Lack of Transportation (Non-Medical):    Physical Activity:     Days of Exercise per Week:     Minutes of Exercise per Session:    Stress:     Feeling of Stress :    Social Connections:     Frequency of Communication with Friends and Family:     Frequency of Social Gatherings with Friends and Family:     Attends Congregation Services:     Active Member of Clubs or Organizations:     Attends Club or Organization Meetings:     Marital Status:    Intimate Partner Violence:     Fear of Current or Ex-Partner:     Emotionally Abused:     Physically Abused:     Sexually Abused:      Current Outpatient Medications on File Prior to Visit   Medication Sig Dispense Refill    pantoprazole (PROTONIX) 40 mg tablet TAKE ONE TABLET BY MOUTH DAILY FOR 2 TO 4 WEEKS THEN TAKE ONLY AS NEEDED 90 Tab 0    metoprolol succinate (TOPROL-XL) 50 mg XL tablet Take 1 Tab by mouth daily. 90 Tab 1    FLUoxetine (PROzac) 20 mg capsule Take 1 capsule(s) every day by oral route. 90 Cap 3    [place] Thyroid 90 mg tablet TAKE ONE TABLET BY MOUTH EVERY DAY IN THE MORNING 90 Tab 3    rOPINIRole (REQUIP) 3 mg tab tab TAKE One tablet by mouth at bedtime 90 Tab 3    Armodafinil 150 mg tab Take 1 Tab by mouth daily.  disopyramide CR (NORPACE CR) 100 mg CR capsule Take 2 Caps by mouth two (2) times a day. 120 Cap 3    Levocetirizine (XYZAL) 5 mg tablet Take  by mouth daily.  TOLTERODINE TARTRATE (DETROL PO) Take  by mouth.  [DISCONTINUED] mirabegron (MYRBETRIQ PO) Take  by mouth.  1 tablet po daily -patient trying medication samples to see if it helps (Patient not taking: Reported on 10/4/2021)       No current facility-administered medications on file prior to visit. Allergies   Allergen Reactions    Buspirone Other (comments)     Dizziness and fatigue    Sulfa (Sulfonamide Antibiotics) Itching     Review of Systems   Constitutional: Negative for chills, fever and malaise/fatigue. Respiratory: Negative for cough, shortness of breath and wheezing. Cardiovascular: Negative for chest pain, palpitations and leg swelling. Gastrointestinal: Negative for diarrhea and vomiting. Genitourinary: Negative for dysuria. Neurological: Negative for dizziness, tingling and weakness. Psychiatric/Behavioral: Negative for depression. The patient is not nervous/anxious. Objective  Visit Vitals  /70 (BP 1 Location: Left upper arm, BP Patient Position: At rest, BP Cuff Size: Adult)   Pulse 62   Temp 97.2 °F (36.2 °C) (Temporal)   Ht 5' 0.5\" (1.537 m)   Wt 175 lb (79.4 kg)   SpO2 96%   BMI 33.61 kg/m²     Physical Exam  Constitutional:       General: She is not in acute distress. Appearance: Normal appearance. She is obese. HENT:      Head: Normocephalic and atraumatic. Mouth/Throat:      Comments: Tip of patient's tongue with a few 1 mm white ulcerated areas  Neck:      Thyroid: No thyroid mass or thyromegaly. Cardiovascular:      Rate and Rhythm: Normal rate and regular rhythm. Heart sounds: Murmur heard. Pulmonary:      Effort: Pulmonary effort is normal. No respiratory distress. Breath sounds: Normal breath sounds. No wheezing. Musculoskeletal:      Cervical back: Neck supple. No muscular tenderness. Right lower leg: No edema. Left lower leg: No edema. Lymphadenopathy:      Cervical: No cervical adenopathy. Skin:     General: Skin is warm and dry. Neurological:      Mental Status: She is alert and oriented to person, place, and time. Mental status is at baseline.    Psychiatric:         Attention and Perception: Attention and perception normal.         Mood and Affect: Mood and affect normal.         Speech: Speech normal.         Behavior: Behavior normal.          Assessment & Plan    ICD-10-CM ICD-9-CM    1. Essential hypertension  I10 401.9    2. Mixed hyperlipidemia  E78.2 272.2 LIPID PANEL      METABOLIC PANEL, COMPREHENSIVE      CBC WITH AUTOMATED DIFF   3. Class 1 obesity due to excess calories with serious comorbidity and body mass index (BMI) of 33.0 to 33.9 in adult  E66.09 278.00     Z68.33 V85.33    4. Acquired hypothyroidism  E03.9 244.9 THYROID CASCADE PROFILE   5. Encounter for immunization  Z23 V03.89 INFLUENZA, INJECTABLE, MDCK, PRESERVATIVE FREE, QUADRIVALENT   6. Screening for lipoid disorders  Z13.220 V77.91 LIPID PANEL      METABOLIC PANEL, COMPREHENSIVE      CBC WITH AUTOMATED DIFF   7. Canker sores oral  K12.0 528.2 triamcinolone acetonide (KENALOG) 0.1 % dental paste     Diagnoses and all orders for this visit:    1. Essential hypertension  Blood pressure is at goal both here and at home. Encouraged her to keep working on healthy lifestyle improvements. If home readings start to drop at all we can consider cutting the metoprolol back to 25 mg.  2. Mixed hyperlipidemia  -     LIPID PANEL  -     METABOLIC PANEL, COMPREHENSIVE  -     CBC WITH AUTOMATED DIFF  Patient is currently lifestyle treated for this history. We are checking levels and will make adjustments if necessary. Handout provided. 3. Class 1 obesity due to excess calories with serious comorbidity and body mass index (BMI) of 33.0 to 33.9 in adult    4. Acquired hypothyroidism  -     THYROID CASCADE PROFILE  Patient reports taking medication daily as directed. We are checking annual level with labs. 5. Encounter for immunization  -     INFLUENZA, INJECTABLE, MDCK, PRESERVATIVE FREE, QUADRIVALENT    6. Screening for lipoid disorders  -     LIPID PANEL  -     METABOLIC PANEL, COMPREHENSIVE  -     CBC WITH AUTOMATED DIFF    7.  Canker sores oral  -     triamcinolone acetonide (KENALOG) 0.1 % dental paste; by Dental route two (2) times a day. I recommend that she discontinue the dissolvable zinc tabs for the time being and also switch back to her old toothpaste. I will give her some dental paste for symptomatic relief of current lesions. Follow-up and Dispositions    · Return in about 6 months (around 4/4/2022) for wellness, NON fasting f/u.        Fabiola Bauer MD

## 2021-10-04 NOTE — PROGRESS NOTES
Chief Complaint   Patient presents with    Follow Up Chronic Condition   1. Have you been to the ER, urgent care clinic since your last visit? Hospitalized since your last visit? No    2. Have you seen or consulted any other health care providers outside of the [address] [place] [address] since your last visit? Include any pap smears or colon screening.  No     3 most recent PHQ Screens 10/4/2021   Little interest or pleasure in doing things Not at all   Feeling down, depressed, irritable, or hopeless Not at all   Total Score PHQ 2 0

## 2021-10-04 NOTE — Clinical Note
10/4/2021 8:21 AM    Ms.  [address] [address]              Sincerely,      Cornelio Valero MD

## 2021-10-05 LAB
ALBUMIN SERPL-MCNC: 4.3 G/DL (ref 3.8–4.9)
ALBUMIN/GLOB SERPL: 2 {RATIO} (ref 1.2–2.2)
ALP SERPL-CCNC: 71 IU/L (ref 44–121)
ALT SERPL-CCNC: 28 IU/L (ref 0–32)
AST SERPL-CCNC: 23 IU/L (ref 0–40)
BASOPHILS # BLD AUTO: 0.1 X10E3/UL (ref 0–0.2)
BASOPHILS NFR BLD AUTO: 1 %
BILIRUB SERPL-MCNC: 0.3 MG/DL (ref 0–1.2)
BUN SERPL-MCNC: 18 MG/DL (ref 6–24)
BUN/CREAT SERPL: 26 (ref 9–23)
CALCIUM SERPL-MCNC: 9.2 MG/DL (ref 8.7–10.2)
CHLORIDE SERPL-SCNC: 99 MMOL/L (ref 96–106)
CHOLEST SERPL-MCNC: 252 MG/DL (ref 100–199)
CO2 SERPL-SCNC: 23 MMOL/L (ref 20–29)
CREAT SERPL-MCNC: 0.69 MG/DL (ref 0.57–1)
EOSINOPHIL # BLD AUTO: 0.2 X10E3/UL (ref 0–0.4)
EOSINOPHIL NFR BLD AUTO: 3 %
ERYTHROCYTE [DISTWIDTH] IN BLOOD BY AUTOMATED COUNT: 14.4 % (ref 11.7–15.4)
GLOBULIN SER CALC-MCNC: 2.2 G/DL (ref 1.5–4.5)
GLUCOSE SERPL-MCNC: 94 MG/DL (ref 65–99)
HCT VFR BLD AUTO: 37.7 % (ref 34–46.6)
HDLC SERPL-MCNC: 55 MG/DL
HGB BLD-MCNC: 12.6 G/DL (ref 11.1–15.9)
IMM GRANULOCYTES # BLD AUTO: 0 X10E3/UL (ref 0–0.1)
IMM GRANULOCYTES NFR BLD AUTO: 1 %
LDLC SERPL CALC-MCNC: 143 MG/DL (ref 0–99)
LYMPHOCYTES # BLD AUTO: 2.3 X10E3/UL (ref 0.7–3.1)
LYMPHOCYTES NFR BLD AUTO: 39 %
MCH RBC QN AUTO: 31.4 PG (ref 26.6–33)
MCHC RBC AUTO-ENTMCNC: 33.4 G/DL (ref 31.5–35.7)
MCV RBC AUTO: 94 FL (ref 79–97)
MONOCYTES # BLD AUTO: 0.4 X10E3/UL (ref 0.1–0.9)
MONOCYTES NFR BLD AUTO: 7 %
NEUTROPHILS # BLD AUTO: 3 X10E3/UL (ref 1.4–7)
NEUTROPHILS NFR BLD AUTO: 49 %
PLATELET # BLD AUTO: 237 X10E3/UL (ref 150–450)
POTASSIUM SERPL-SCNC: 4.4 MMOL/L (ref 3.5–5.2)
PROT SERPL-MCNC: 6.5 G/DL (ref 6–8.5)
RBC # BLD AUTO: 4.01 X10E6/UL (ref 3.77–5.28)
SODIUM SERPL-SCNC: 137 MMOL/L (ref 134–144)
TRIGL SERPL-MCNC: 300 MG/DL (ref 0–149)
TSH SERPL DL<=0.005 MIU/L-ACNC: 2.53 UIU/ML (ref 0.45–4.5)
VLDLC SERPL CALC-MCNC: 54 MG/DL (ref 5–40)
WBC # BLD AUTO: 5.9 X10E3/UL (ref 3.4–10.8)

## 2022-03-01 LAB — PAP SMEAR, EXTERNAL: NORMAL

## 2022-03-18 PROBLEM — R00.2 PALPITATIONS: Status: ACTIVE | Noted: 2020-09-22

## 2022-03-19 PROBLEM — I42.2 OTHER HYPERTROPHIC CARDIOMYOPATHY (HCC): Status: ACTIVE | Noted: 2020-09-22

## 2022-03-20 PROBLEM — K21.9 GASTRO-ESOPHAGEAL REFLUX DISEASE WITHOUT ESOPHAGITIS: Status: ACTIVE | Noted: 2020-09-22

## 2022-04-11 ENCOUNTER — OFFICE VISIT (OUTPATIENT)
Dept: FAMILY MEDICINE CLINIC | Age: 62
End: 2022-04-11
Payer: COMMERCIAL

## 2022-04-11 VITALS
BODY MASS INDEX: 33.23 KG/M2 | OXYGEN SATURATION: 96 % | DIASTOLIC BLOOD PRESSURE: 80 MMHG | HEART RATE: 66 BPM | TEMPERATURE: 97.3 F | SYSTOLIC BLOOD PRESSURE: 130 MMHG | WEIGHT: 176 LBS | HEIGHT: 61 IN

## 2022-04-11 DIAGNOSIS — F32.A DEPRESSIVE DISORDER: ICD-10-CM

## 2022-04-11 DIAGNOSIS — Z13.31 DEPRESSION SCREENING: ICD-10-CM

## 2022-04-11 DIAGNOSIS — R20.0 NUMBNESS OF RIGHT FOOT: ICD-10-CM

## 2022-04-11 DIAGNOSIS — I42.2 OTHER HYPERTROPHIC CARDIOMYOPATHY (HCC): ICD-10-CM

## 2022-04-11 DIAGNOSIS — G47.33 OBSTRUCTIVE SLEEP APNEA: ICD-10-CM

## 2022-04-11 DIAGNOSIS — Z00.00 WELLNESS EXAMINATION: Primary | ICD-10-CM

## 2022-04-11 DIAGNOSIS — E03.9 ACQUIRED HYPOTHYROIDISM: ICD-10-CM

## 2022-04-11 PROCEDURE — 99396 PREV VISIT EST AGE 40-64: CPT | Performed by: FAMILY MEDICINE

## 2022-04-11 PROCEDURE — 99214 OFFICE O/P EST MOD 30 MIN: CPT | Performed by: FAMILY MEDICINE

## 2022-04-11 RX ORDER — PREDNISONE 20 MG/1
40 TABLET ORAL DAILY
Qty: 10 TABLET | Refills: 0 | Status: SHIPPED | OUTPATIENT
Start: 2022-04-11 | End: 2022-04-16

## 2022-04-11 NOTE — PROGRESS NOTES
Chief Complaint   Patient presents with    Annual Wellness Visit    Other     half of right foot has been numb for about 1 month and causing her to be off balance      1. Have you been to the ER, urgent care clinic since your last visit? Hospitalized since your last visit? No    2. Have you seen or consulted any other health care providers outside of the [address] [place] [address] since your last visit? Include any pap smears or colon screening. Yes, patient has seen gyn and had Pap done on 3/2022      3 most recent PHQ Screens 4/11/2022   Little interest or pleasure in doing things Several days   Feeling down, depressed, irritable, or hopeless Not at all   Total Score PHQ 2 1   Trouble falling or staying asleep, or sleeping too much Several days   Feeling tired or having little energy Several days   Poor appetite, weight loss, or overeating Several days   Feeling bad about yourself - or that you are a failure or have let yourself or your family down Not at all   Trouble concentrating on things such as school, work, reading, or watching TV Not at all   Moving or speaking so slowly that other people could have noticed; or the opposite being so fidgety that others notice Not at all   Thoughts of being better off dead, or hurting yourself in some way Not at all   PHQ 9 Score 4   How difficult have these problems made it for you to do your work, take care of your home and get along with others Somewhat difficult       Fall Risk Assessment, last 12 mths 4/11/2022   Able to walk? Yes   Fall in past 12 months? 0   Do you feel unsteady? 1   Are you worried about falling 0   Is TUG test greater than 12 seconds?  0   Is the gait abnormal? 0       Patient states she is scheduled for Bone density and mammogram in may 2022

## 2022-04-11 NOTE — ASSESSMENT & PLAN NOTE
well controlled, continue current medications, following with [facility] cardiology

## 2022-04-11 NOTE — PROGRESS NOTES
Subjective  Chief Complaint   Patient presents with    Annual Wellness Visit    Other     half of right foot has been numb for about 1 month and causing her to be off balance      HPI:  Samira Peoples is a 63 y.o. female. Samira Peoples is on the schedule today for annual wellness exam and also has a few acute concerns to discuss. For the wellness:   Flu vaccine- Recommended every fall  COVID vaccine series- complete  Tetanus- Tdap 2013  Shingrix- series completed  Pneumovax 23-  N/A  Prevnar 21- at age 74  HCV screening- complete  Pap- just done at [facility]  Mammo- scheduled for next month  Dexa- schedule for next month  Colon cancer screening- colonoscopy 2/15/222. repeat 5 yrs. Smoking status- never  Low dose CT scan- N/A  PHQ2 Score = 4-- not feeling down. Mostly just tired due to VISH. See below. Exercise- walking    For the acute/chronic issues:  Lateral portion of right foot numb x 1 month. medial side is within normal range. She does get occasional low back pain but that is on left side and numbness did not change with recent flare. Numbness is causing her to feel off balance at times. She also wonders if she should increase prozac. Her sleep doc started her on provigil but stated he would only continue it if she uses the CPAP. She has not been able to tolerate CPAP and is upset because she felt fantastic on the provigil.     Past Medical History:   Diagnosis Date    Acquired hypothyroidism     Cancer ([facility] [address]     skin    Depressive disorder     Essential hypertension     Eye problems     Heart murmur     Mixed hyperlipidemia     Restless legs     Thyroid disease     Urinary incontinence, mixed      Family History   Problem Relation Age of Onset    Heart Surgery Father         CABG x3    Hypertension Father     High Cholesterol Father     Heart Disease Father     Bipolar Disorder Father     Coronary Art Dis Paternal Grandfather     Heart Disease Paternal Grandfather     High Cholesterol Sister     Hypertension Sister     Depression Mother     Cancer Mother         lung    Diabetes Maternal Grandmother     Heart Disease Maternal Grandfather      Social History     Socioeconomic History    Marital status:      Spouse name: Not on file    Number of children: Not on file    Years of education: Not on file    Highest education level: Not on file   Occupational History    Not on file   Tobacco Use    Smoking status: Never Smoker    Smokeless tobacco: Never Used   Vaping Use    Vaping Use: Never used   Substance and Sexual Activity    Alcohol use: No    Drug use: Never    Sexual activity: Not on file   Other Topics Concern    Not on file   Social History Narrative    Not on file     Social Determinants of Health     Financial Resource Strain:     Difficulty of Paying Living Expenses: Not on file   Food Insecurity:     Worried About Running Out of Food in the Last Year: Not on file    Gi of Food in the Last Year: Not on file   Transportation Needs:     Lack of Transportation (Medical): Not on file    Lack of Transportation (Non-Medical):  Not on file   Physical Activity:     Days of Exercise per Week: Not on file    Minutes of Exercise per Session: Not on file   Stress:     Feeling of Stress : Not on file   Social Connections:     Frequency of Communication with Friends and Family: Not on file    Frequency of Social Gatherings with Friends and Family: Not on file    Attends Sabianist Services: Not on file    Active Member of Clubs or Organizations: Not on file    Attends Club or Organization Meetings: Not on file    Marital Status: Not on file   Intimate Partner Violence:     Fear of Current or Ex-Partner: Not on file    Emotionally Abused: Not on file    Physically Abused: Not on file    Sexually Abused: Not on file   Housing Stability:     Unable to Pay for Housing in the Last Year: Not on file    Number of [place] in the Last Year: Not on file    Unstable Housing in the Last Year: Not on file     Current Outpatient Medications on File Prior to Visit   Medication Sig Dispense Refill    FLUoxetine (PROzac) 20 mg capsule Take 1 capsule(s) every day by oral route. 90 Capsule 1    [place] Thyroid 90 mg tablet TAKE ONE TABLET BY MOUTH EVERY DAY IN THE MORNING 90 Tablet 1    rOPINIRole (REQUIP) 3 mg tab tab TAKE 1 TABLET BY MOUTH AT BEDTIME 90 Tablet 1    pantoprazole (PROTONIX) 40 mg tablet TAKE ONE TABLET BY MOUTH DAILY FOR 2 TO 4 WEEKS THEN TAKE ONLY AS NEEDED 90 Tablet 0    triamcinolone acetonide (KENALOG) 0.1 % dental paste by Dental route two (2) times a day. 5 g 2    metoprolol succinate (TOPROL-XL) 50 mg XL tablet Take 1 Tab by mouth daily. 90 Tab 1    Armodafinil 150 mg tab Take 1 Tab by mouth daily.  disopyramide CR (NORPACE CR) 100 mg CR capsule Take 2 Caps by mouth two (2) times a day. 120 Cap 3    Levocetirizine (XYZAL) 5 mg tablet Take  by mouth daily.  TOLTERODINE TARTRATE (DETROL PO) Take  by mouth. No current facility-administered medications on file prior to visit. Allergies   Allergen Reactions    Buspirone Other (comments)     Dizziness and fatigue    Sulfa (Sulfonamide Antibiotics) Itching       Objective  Visit Vitals  /80 (BP 1 Location: Left upper arm, BP Patient Position: At rest, BP Cuff Size: Adult)   Pulse 66   Temp 97.3 °F (36.3 °C) (Temporal)   Ht 5' 0.5\" (1.537 m)   Wt 176 lb (79.8 kg)   SpO2 96%   BMI 33.81 kg/m²     Physical Exam  Constitutional:       General: She is not in acute distress. Appearance: Normal appearance. She is obese. HENT:      Head: Normocephalic and atraumatic. Neck:      Thyroid: No thyroid mass or thyromegaly. Cardiovascular:      Rate and Rhythm: Normal rate and regular rhythm. Heart sounds: No murmur heard. Pulmonary:      Effort: Pulmonary effort is normal. No respiratory distress. Breath sounds: Normal breath sounds. No wheezing. Musculoskeletal:      Cervical back: Neck supple. No muscular tenderness. Right lower leg: No edema. Left lower leg: No edema. Feet:    Feet:      Comments: Area of decrease sensation circled on image. Within normal range outside of that region. No edema, deformity, etc.  Lymphadenopathy:      Cervical: No cervical adenopathy. Skin:     General: Skin is warm and dry. Neurological:      Mental Status: She is alert and oriented to person, place, and time. Mental status is at baseline. Psychiatric:         Attention and Perception: Attention and perception normal.         Mood and Affect: Mood and affect normal.         Speech: Speech normal.         Behavior: Behavior normal.          Assessment & Plan    ICD-10-CM ICD-9-CM    1. Wellness examination  Z00.00 V70.0    2. Depression screening  Z13.31 V79.0    3. Numbness of right foot  R20.0 782.0 XR SPINE LUMB 2 OR 3 V      predniSONE (DELTASONE) 20 mg tablet      REFERRAL TO ORTHOPEDICS      VITAMIN B12      THYROID CASCADE PROFILE      FOLATE   4. Depressive disorder  F32. A 311    5. Other hypertrophic [facility] ([facility]  I42.2 425.18    6. Obstructive sleep apnea  G47.33 327.23    7. Acquired hypothyroidism  E03.9 244.9      Diagnoses and all orders for this visit:    1. Wellness examination  We are getting patient up to date on recommended preventative measures as noted. 2. Depression screening    3. Numbness of right foot  -     XR SPINE LUMB 2 OR 3 V; Future  -     predniSONE (DELTASONE) 20 mg tablet; Take 40 mg by mouth daily for 5 days.  -     REFERRAL TO ORTHOPEDICS  -     VITAMIN B12  -     THYROID CASCADE PROFILE  -     FOLATE  I am ordering labs and x-ray.   Due to the distribution of the numbness I suspect that this may be coming from her lumbosacral spine and also ordered a prednisone burst and orthopedic specialist referral.    4. Depressive disorder  While I am certainly willing to increase her Prozac to next dose, I do not believe that it is going to give her the effectiveness that she is looking for. She is not feeling depressed and is fatigued due to her untreated sleep apnea. Patient is agreeable to talk to her sleep doctor about next steps after failing CPAP. 5. Other hypertrophic cardiomyopathy ([address]  Assessment & Plan:   well controlled, continue current medications, following with [facility] cardiology    6. Obstructive sleep apnea  He is not tolerating CPAP. She is going to talk to her specialist regarding having a hypoglossal nerve stimulation device implanted. 7. Acquired hypothyroidism  TSH level was perfect 6 months ago we are rechecking today due to the numbness complaint. Follow-up and Dispositions    · Return in about 6 months (around 10/11/2022) for f/u HTN, non fasting.        Bam Sifuentes MD

## 2022-04-12 ENCOUNTER — HOSPITAL ENCOUNTER (OUTPATIENT)
Dept: GENERAL RADIOLOGY | Age: 62
Discharge: HOME | End: 2022-04-12
Payer: COMMERCIAL

## 2022-04-12 DIAGNOSIS — R20.0 NUMBNESS OF RIGHT FOOT: ICD-10-CM

## 2022-04-12 LAB
FOLATE SERPL-MCNC: 12.3 NG/ML
INTERPRETIVE COMMENT, 330017: NORMAL
T4 FREE SERPL-MCNC: 1.12 NG/DL (ref 0.82–1.77)
THYROPEROXIDASE AB SERPL-ACNC: <8 IU/ML (ref 0–34)
TSH SERPL DL<=0.005 MIU/L-ACNC: 9.12 UIU/ML (ref 0.45–4.5)
VIT B12 SERPL-MCNC: 505 PG/ML (ref 232–1245)

## 2022-04-12 PROCEDURE — 72100 X-RAY EXAM L-S SPINE 2/3 VWS: CPT

## 2022-06-03 ENCOUNTER — TELEPHONE (OUTPATIENT)
Dept: FAMILY MEDICINE CLINIC | Age: 62
End: 2022-06-03

## 2022-06-03 LAB — AMB DEXA, EXTERNAL: NORMAL

## 2022-06-03 NOTE — TELEPHONE ENCOUNTER
----- Message from Valentin Acosta MD sent at 4/11/2022  9:23 AM EDT -----  Regarding: HM from [facility]  Pt should have pap from 3/2022 and mammo and dexa from 5/2022 all through [facility].   If not in chart, please request.

## 2022-10-17 ENCOUNTER — OFFICE VISIT (OUTPATIENT)
Dept: FAMILY MEDICINE CLINIC | Age: 62
End: 2022-10-17
Payer: COMMERCIAL

## 2022-10-17 VITALS
TEMPERATURE: 97.2 F | BODY MASS INDEX: 34.17 KG/M2 | HEIGHT: 61 IN | HEART RATE: 58 BPM | WEIGHT: 181 LBS | SYSTOLIC BLOOD PRESSURE: 120 MMHG | OXYGEN SATURATION: 97 % | DIASTOLIC BLOOD PRESSURE: 76 MMHG

## 2022-10-17 DIAGNOSIS — Z23 ENCOUNTER FOR IMMUNIZATION: ICD-10-CM

## 2022-10-17 DIAGNOSIS — I10 ESSENTIAL HYPERTENSION: ICD-10-CM

## 2022-10-17 DIAGNOSIS — M10.9 ACUTE GOUT OF RIGHT FOOT, UNSPECIFIED CAUSE: ICD-10-CM

## 2022-10-17 DIAGNOSIS — M85.852 OSTEOPENIA OF NECK OF LEFT FEMUR: ICD-10-CM

## 2022-10-17 DIAGNOSIS — E78.2 MIXED HYPERLIPIDEMIA: Primary | ICD-10-CM

## 2022-10-17 DIAGNOSIS — E03.9 ACQUIRED HYPOTHYROIDISM: ICD-10-CM

## 2022-10-17 DIAGNOSIS — E66.09 CLASS 1 OBESITY DUE TO EXCESS CALORIES WITH SERIOUS COMORBIDITY AND BODY MASS INDEX (BMI) OF 34.0 TO 34.9 IN ADULT: ICD-10-CM

## 2022-10-17 DIAGNOSIS — Z13.220 SCREENING FOR LIPOID DISORDERS: ICD-10-CM

## 2022-10-17 PROCEDURE — 90686 IIV4 VACC NO PRSV 0.5 ML IM: CPT | Performed by: FAMILY MEDICINE

## 2022-10-17 PROCEDURE — 99214 OFFICE O/P EST MOD 30 MIN: CPT | Performed by: FAMILY MEDICINE

## 2022-10-17 PROCEDURE — 90471 IMMUNIZATION ADMIN: CPT | Performed by: FAMILY MEDICINE

## 2022-10-17 RX ORDER — METOPROLOL SUCCINATE 50 MG/1
50 TABLET, EXTENDED RELEASE ORAL DAILY
Qty: 90 TABLET | Refills: 1 | Status: SHIPPED | OUTPATIENT
Start: 2022-10-17

## 2022-10-17 RX ORDER — LEVOTHYROXINE AND LIOTHYRONINE 57; 13.5 UG/1; UG/1
TABLET ORAL
Qty: 90 TABLET | Refills: 2 | Status: SHIPPED | OUTPATIENT
Start: 2022-10-17

## 2022-10-17 NOTE — PROGRESS NOTES
Chief Complaint   Patient presents with    Follow-up     6 month follow up on BP    1. Have you been to the ER, urgent care clinic since your last visit? Hospitalized since your last visit? No    2. Have you seen or consulted any other health care providers outside of the [address] [place] [address] since your last visit? Include any pap smears or colon screening.  Yes, patient has seen cardiology and has to have pacemaker put in on 10/31/22

## 2022-10-17 NOTE — PROGRESS NOTES
Subjective  Chief Complaint   Patient presents with    Follow-up     6 month follow up on BP      HPI:  Janet Gilbert is a 63 y.o. female. She is due for follow-up on hypertension, hyperlipidemia, and hypothyroidism. She reports taking associated medication as directed. She notes that since her last visit she did have an episode of exquisite pain on the lateral portion of her right foot. She was seen at urgent care and diagnosed as gout. She was treated with a short course of steroids. Pain has resolved and not returned. She also notes that she had a bone density done through her gynecology office but has not received results.     Past Medical History:   Diagnosis Date    Acquired hypothyroidism     Cancer ([facility] [address]     skin    Depressive disorder     Essential hypertension     Eye problems     Heart murmur     Mixed hyperlipidemia     Restless legs     Thyroid disease     Urinary incontinence, mixed      Family History   Problem Relation Age of Onset    Heart Surgery Father         CABG x3    Hypertension Father     High Cholesterol Father     Heart Disease Father     Bipolar Disorder Father     Coronary Art Dis Paternal Grandfather     Heart Disease Paternal Grandfather     High Cholesterol Sister     Hypertension Sister     Depression Mother     Cancer Mother         lung    Diabetes Maternal Grandmother     Heart Disease Maternal Grandfather      Social History     Socioeconomic History    Marital status:      Spouse name: Not on file    Number of children: Not on file    Years of education: Not on file    Highest education level: Not on file   Occupational History    Not on file   Tobacco Use    Smoking status: Never    Smokeless tobacco: Never   Vaping Use    Vaping Use: Never used   Substance and Sexual Activity    Alcohol use: No    Drug use: Never    Sexual activity: Not on file   Other Topics Concern    Not on file   Social History Narrative    Not on file     Social Determinants of Health Financial Resource Strain: Low Risk     Difficulty of Paying Living Expenses: Not hard at all   Food Insecurity: No Food Insecurity    Worried About Running Out of Food in the Last Year: Never true    Ran Out of Food in the Last Year: Never true   Transportation Needs: Not on file   Physical Activity: Not on file   Stress: Not on file   Social Connections: Not on file   Intimate Partner Violence: Not on file   Housing Stability: Not on file     Current Outpatient Medications on File Prior to Visit   Medication Sig Dispense Refill    rOPINIRole (REQUIP) 3 mg tab tab TAKE 1 TABLET BY MOUTH AT BEDTIME 90 Tablet 1    FLUoxetine (PROzac) 20 mg capsule Take 1 capsule(s) every day by oral route. 90 Capsule 1    pantoprazole (PROTONIX) 40 mg tablet TAKE ONE TABLET BY MOUTH DAILY FOR 2 TO 4 WEEKS THEN TAKE ONLY AS NEEDED 90 Tablet 0    disopyramide CR (NORPACE CR) 100 mg CR capsule Take 2 Caps by mouth two (2) times a day. 120 Cap 3    levocetirizine (XYZAL) 5 mg tablet Take  by mouth daily. tolterodine (DETROL) 2 mg tablet Take  by mouth daily. [DISCONTINUED] [place] Thyroid 90 mg tablet TAKE ONE TABLET BY MOUTH EVERY DAY IN THE MORNING 90 Tablet 1    [DISCONTINUED] triamcinolone acetonide (KENALOG) 0.1 % dental paste by Dental route two (2) times a day. (Patient not taking: Reported on 10/17/2022) 5 g 2    [DISCONTINUED] metoprolol succinate (TOPROL-XL) 50 mg XL tablet Take 1 Tab by mouth daily. 90 Tab 1    [DISCONTINUED] Armodafinil 150 mg tab Take 1 Tab by mouth daily. (Patient not taking: Reported on 10/17/2022)       No current facility-administered medications on file prior to visit.      Allergies   Allergen Reactions    Buspirone Other (comments)     Dizziness and fatigue    Sulfa (Sulfonamide Antibiotics) Itching       Objective  Visit Vitals  /76 (BP 1 Location: Left upper arm, BP Patient Position: At rest, BP Cuff Size: Adult)   Pulse (!) 58   Temp 97.2 °F (36.2 °C) (Temporal)   Ht 5' 0.5\" (1.537 m)   Wt 181 lb (82.1 kg)   SpO2 97%   BMI 34.77 kg/m²     Physical Exam  Constitutional:       General: She is not in acute distress. Appearance: Normal appearance. She is obese. HENT:      Head: Normocephalic and atraumatic. Neck:      Thyroid: No thyroid mass or thyromegaly. Cardiovascular:      Rate and Rhythm: Normal rate and regular rhythm. Heart sounds: No murmur heard. Pulmonary:      Effort: Pulmonary effort is normal. No respiratory distress. Breath sounds: Normal breath sounds. No wheezing. Musculoskeletal:      Cervical back: Neck supple. No muscular tenderness. Right lower leg: No edema. Left lower leg: No edema. Lymphadenopathy:      Cervical: No cervical adenopathy. Skin:     General: Skin is warm and dry. Neurological:      Mental Status: She is alert and oriented to person, place, and time. Mental status is at baseline. Psychiatric:         Attention and Perception: Attention and perception normal.         Mood and Affect: Mood and affect normal.         Speech: Speech normal.         Behavior: Behavior normal.        Assessment & Plan    ICD-10-CM ICD-9-CM    1. Mixed hyperlipidemia  E78.2 272.2 LIPID PANEL      METABOLIC PANEL, COMPREHENSIVE      CBC WITH AUTOMATED DIFF      2. Essential hypertension  I10 401.9 metoprolol succinate (TOPROL-XL) 50 mg XL tablet      3. Class 1 obesity due to excess calories with serious comorbidity and body mass index (BMI) of 34.0 to 34.9 in adult  E66.09 278.00     Z68.34 V85.34       4. Acquired hypothyroidism  E03.9 244.9 THYROID CASCADE PROFILE      thyroid, Pork, ([place] Thyroid) 90 mg tablet      5. Screening for lipoid disorders  Z13.220 V77.91 LIPID PANEL      METABOLIC PANEL, COMPREHENSIVE      CBC WITH AUTOMATED DIFF      6. Acute gout of right foot, unspecified cause  M10.9 274.01 URIC ACID      7. Osteopenia of neck of left femur  M85.852 733.90       8.  Encounter for immunization  Z23 V03.89 INFLUENZA, FLUARIX, FLULAVAL, FLUZONE (AGE 6 MO+), AFLURIA(AGE 3Y+) IM, PF, 0.5 ML        Diagnoses and all orders for this visit:    1. Mixed hyperlipidemia  -     LIPID PANEL  -     METABOLIC PANEL, COMPREHENSIVE  -     CBC WITH AUTOMATED DIFF  She is currently lifestyle controlled for this history. We will check levels and adjust if necessary. 2. Essential hypertension  -     metoprolol succinate (TOPROL-XL) 50 mg XL tablet; Take 1 Tablet by mouth daily. Blood pressure excellent on intake today. We will continue current dose of metoprolol. 3. Class 1 obesity due to excess calories with serious comorbidity and body mass index (BMI) of 34.0 to 34.9 in adult    4. Acquired hypothyroidism  -     THYROID CASCADE PROFILE  -     thyroid, Pork, ([place] Thyroid) 90 mg tablet; TAKE ONE TABLET BY MOUTH EVERY DAY IN THE MORNING  Patient reports taking medication daily as directed. We are checking annual level with labs. 5. Screening for lipoid disorders  -     LIPID PANEL  -     METABOLIC PANEL, COMPREHENSIVE  -     CBC WITH AUTOMATED DIFF    6. Acute gout of right foot, unspecified cause  -     URIC ACID  I am adding a uric acid level to her labs for this new diagnosis. In the meantime, handout provided for conservative care. 7. Osteopenia of neck of left femur  I was able to look up her bone density results and review those with her. I am recommending regular calcium plus D intake daily and weight resistance exercises to prevent progression to osteoporosis. No need for prescription medication at this time due to low FRAX scores. 8. Encounter for immunization  -     INFLUENZA, FLUARIX, FLULAVAL, FLUZONE (AGE 6 MO+), AFLURIA(AGE 3Y+) IM, PF, 0.5 ML      Aspects of this note have been generated using voice recognition software. Despite editing, there may be some syntax errors. Follow-up and Dispositions    Return in about 6 months (around 4/17/2023) for f/u HTN, non fasting.        Diana Sage MD

## 2022-10-18 LAB
ALBUMIN SERPL-MCNC: 4.8 G/DL (ref 3.8–4.9)
ALBUMIN/GLOB SERPL: 2.2 {RATIO} (ref 1.2–2.2)
ALP SERPL-CCNC: 76 IU/L (ref 44–121)
ALT SERPL-CCNC: 19 IU/L (ref 0–32)
AST SERPL-CCNC: 19 IU/L (ref 0–40)
BASOPHILS # BLD AUTO: 0.1 X10E3/UL (ref 0–0.2)
BASOPHILS NFR BLD AUTO: 1 %
BILIRUB SERPL-MCNC: 0.3 MG/DL (ref 0–1.2)
BUN SERPL-MCNC: 18 MG/DL (ref 8–27)
BUN/CREAT SERPL: 23 (ref 12–28)
CALCIUM SERPL-MCNC: 9.7 MG/DL (ref 8.7–10.3)
CHLORIDE SERPL-SCNC: 101 MMOL/L (ref 96–106)
CHOLEST SERPL-MCNC: 253 MG/DL (ref 100–199)
CO2 SERPL-SCNC: 20 MMOL/L (ref 20–29)
CREAT SERPL-MCNC: 0.79 MG/DL (ref 0.57–1)
EGFR: 86 ML/MIN/1.73
EOSINOPHIL # BLD AUTO: 0.1 X10E3/UL (ref 0–0.4)
EOSINOPHIL NFR BLD AUTO: 2 %
ERYTHROCYTE [DISTWIDTH] IN BLOOD BY AUTOMATED COUNT: 13.2 % (ref 11.7–15.4)
GLOBULIN SER CALC-MCNC: 2.2 G/DL (ref 1.5–4.5)
GLUCOSE SERPL-MCNC: 90 MG/DL (ref 70–99)
HCT VFR BLD AUTO: 43.7 % (ref 34–46.6)
HDLC SERPL-MCNC: 53 MG/DL
HGB BLD-MCNC: 14.4 G/DL (ref 11.1–15.9)
IMM GRANULOCYTES # BLD AUTO: 0 X10E3/UL (ref 0–0.1)
IMM GRANULOCYTES NFR BLD AUTO: 0 %
LDLC SERPL CALC-MCNC: 148 MG/DL (ref 0–99)
LYMPHOCYTES # BLD AUTO: 2.7 X10E3/UL (ref 0.7–3.1)
LYMPHOCYTES NFR BLD AUTO: 46 %
MCH RBC QN AUTO: 29.9 PG (ref 26.6–33)
MCHC RBC AUTO-ENTMCNC: 33 G/DL (ref 31.5–35.7)
MCV RBC AUTO: 91 FL (ref 79–97)
MONOCYTES # BLD AUTO: 0.5 X10E3/UL (ref 0.1–0.9)
MONOCYTES NFR BLD AUTO: 8 %
NEUTROPHILS # BLD AUTO: 2.5 X10E3/UL (ref 1.4–7)
NEUTROPHILS NFR BLD AUTO: 43 %
PLATELET # BLD AUTO: 258 X10E3/UL (ref 150–450)
POTASSIUM SERPL-SCNC: 4.3 MMOL/L (ref 3.5–5.2)
PROT SERPL-MCNC: 7 G/DL (ref 6–8.5)
RBC # BLD AUTO: 4.82 X10E6/UL (ref 3.77–5.28)
SODIUM SERPL-SCNC: 137 MMOL/L (ref 134–144)
TRIGL SERPL-MCNC: 287 MG/DL (ref 0–149)
TSH SERPL DL<=0.005 MIU/L-ACNC: 1.25 UIU/ML (ref 0.45–4.5)
URATE SERPL-MCNC: 7.5 MG/DL (ref 3–7.2)
VLDLC SERPL CALC-MCNC: 52 MG/DL (ref 5–40)
WBC # BLD AUTO: 5.8 X10E3/UL (ref 3.4–10.8)

## 2023-04-20 ENCOUNTER — VIRTUAL VISIT (OUTPATIENT)
Dept: FAMILY MEDICINE CLINIC | Age: 63
End: 2023-04-20
Payer: COMMERCIAL

## 2023-04-20 DIAGNOSIS — M1A.0710 CHRONIC GOUT OF RIGHT FOOT, UNSPECIFIED CAUSE: ICD-10-CM

## 2023-04-20 DIAGNOSIS — I42.2 OTHER HYPERTROPHIC CARDIOMYOPATHY (HCC): ICD-10-CM

## 2023-04-20 DIAGNOSIS — I10 ESSENTIAL HYPERTENSION: Primary | ICD-10-CM

## 2023-04-20 PROCEDURE — 99214 OFFICE O/P EST MOD 30 MIN: CPT | Performed by: FAMILY MEDICINE

## 2023-04-20 RX ORDER — ALLOPURINOL 100 MG/1
100 TABLET ORAL DAILY
Qty: 90 TABLET | Refills: 1 | Status: SHIPPED | OUTPATIENT
Start: 2023-04-20

## 2023-04-20 RX ORDER — METOPROLOL SUCCINATE 25 MG/1
25 TABLET, EXTENDED RELEASE ORAL DAILY
Qty: 90 TABLET | Refills: 1 | Status: SHIPPED | OUTPATIENT
Start: 2023-04-20

## 2023-04-20 NOTE — PROGRESS NOTES
Consent:  Shedrick Kanner, was evaluated through a synchronous (real-time) audio-video encounter. The patient (or guardian if applicable) is aware that this is a billable service, which includes applicable co-pays. This Virtual Visit was conducted with patient's (and/or legal guardian's) consent. The visit was conducted pursuant to the emergency declaration under the [address] [place] [address] and the [employer] General Act. Patient identification was verified, and a caregiver was present when appropriate. The patient was located in a state where the provider was licensed to provide care. 712  Subjective:   Shedrick Kanner is a 66 y.o. female who was seen for Follow-up (HTN)      Since her last visit, she had her pacemaker placed 10/31/2022. She reports that she felt terrible after the procedure. There was discussion of her taking a trial medication through her [facility] cardiologist but she has been hesitant to do so. She went away on a trip and forgot to take her metoprolol with her. She suddenly felt tremendously better. When she got home she started cutting her tablets in half to take only 25 mg daily. She states that she has felt 80% improvement in symptoms and overall is feeling far better than prior to the pacemaker implantation. Her cardiologist has advised her to stay at the 25 mg dose. Home blood pressure readings have been approximately 135/70. She also notes that she has had another flare of the gout pain in her right foot since our last visit. She was able to control it with over-the-counter medications. Prior to Admission medications    Medication Sig Start Date End Date Taking? Authorizing Provider   metoprolol succinate (TOPROL-XL) 25 mg XL tablet Take 1 Tablet by mouth daily. 4/20/23  Yes Lane Cowan MD   allopurinoL (ZYLOPRIM) 100 mg tablet Take 1 Tablet by mouth daily.  4/20/23  Yes Robin Kulkarni Jj Bobby MD   rOPINIRole (REQUIP) 3 mg tab tab TAKE 1 TABLET BY MOUTH AT BEDTIME 2/8/23  Yes Kenyetta Brito MD   FLUoxetine (PROzac) 20 mg capsule Take 1 capsule(s) every day by oral route. 2/8/23  Yes Kenyetta Brito MD   pantoprazole (PROTONIX) 40 mg tablet TAKE ONE TABLET BY MOUTH DAILY FOR 2 TO 4 WEEKS THEN TAKE ONLY AS NEEDED 10/30/22  Yes Kenyetta Brito MD   thyroid, Pork, ([place] Thyroid) 90 mg tablet TAKE ONE TABLET BY MOUTH EVERY DAY IN THE MORNING 10/17/22  Yes Kenyetta Brito MD   disopyramide CR (NORPACE CR) 100 mg CR capsule Take 2 Caps by mouth two (2) times a day. 10/2/13  Yes Nava Dykes MD   levocetirizine (XYZAL) 5 mg tablet Take  by mouth daily. Yes Provider, Historical   tolterodine (DETROL) 2 mg tablet Take  by mouth daily. Yes Provider, Historical   metoprolol succinate (TOPROL-XL) 50 mg XL tablet Take 1 Tablet by mouth daily. 10/17/22 4/20/23  Kenyetta Brito MD     Allergies   Allergen Reactions    Buspirone Other (comments)     Dizziness and fatigue    Sulfa (Sulfonamide Antibiotics) Itching     Patient Active Problem List    Diagnosis    Chronic gout of right foot     Starting allopurinol 4/20/23      Osteopenia of neck of left femur     Last Dexa 6/2022      Gastro-esophageal reflux disease without esophagitis    Other hypertrophic cardiomyopathy [facility])     Following with [facility] cardiology. Palpitations    Depressive disorder    Heart murmur    Acquired hypothyroidism    Mixed hyperlipidemia    Urinary incontinence, mixed    Restless legs    Obstructive sleep apnea     Intolerant of masks, had tried many      Class 1 obesity due to excess calories with serious comorbidity and body mass index (BMI) of 34.0 to 34.9 in adult    Syncope and collapse     Syncope on 11/7/13. Witnessed by daughter.        Left ventricular outflow tract obstruction     MARYANNE of the anterior MV leaflet in the absence of significant septal hyptertrophy      Essential hypertension Other dyspnea and respiratory abnormality       Objective:   Vital Signs: (As obtained by patient/caregiver at home)  There were no vitals taken for this visit. [INSTRUCTIONS:  \"[x]\" Indicates a positive item  \"[]\" Indicates a negative item  -- DELETE ALL ITEMS NOT EXAMINED]    Constitutional: [x] Appears well-developed and well-nourished [x] No apparent distress      [] Abnormal -     Mental status: [x] Alert and awake  [x] Oriented to person/place/time [x] Able to follow commands    [] Abnormal -     Eyes:   EOM    [x]  Normal    [] Abnormal -   Sclera  [x]  Normal    [] Abnormal -          Discharge [x]  None visible   [] Abnormal -     HENT: [x] Normocephalic, atraumatic  [] Abnormal -   [] Mouth/Throat: Mucous membranes are moist    External Ears [] Normal  [] Abnormal -    Neck: [x] No visualized mass [] Abnormal -     Pulmonary/Chest: [x] Respiratory effort normal   [x] No visualized signs of difficulty breathing or respiratory distress        [] Abnormal -        Neurological:        [x] No Facial Asymmetry (Cranial nerve 7 motor function) (limited exam due to video visit)          [x] No gaze palsy        [] Abnormal -          Skin:        [x] No significant exanthematous lesions or discoloration noted on facial skin         [] Abnormal -            Psychiatric:       [x] Normal Affect [] Abnormal -        [x] No Hallucinations    Other pertinent observable physical exam findings:-              Assessment & Plan:   Diagnoses and all orders for this visit:    1. Essential hypertension  Today's blood pressure was borderline but other readings at home have been at goal.  We will continue current medication regimen. Now that she is feeling better it is also my hope that she will be more active and get better control on the blood pressure through that as well. 2. Other hypertrophic cardiomyopathy (HCC)  -     metoprolol succinate (TOPROL-XL) 25 mg XL tablet; Take 1 Tablet by mouth daily.   She did not tolerate 50 mg of metoprolol due to side effects but is doing well at the 25 mg dose. She is continuing to follow with cardiology at [facility] as well. 3. Chronic gout of right foot, unspecified cause  -     allopurinoL (ZYLOPRIM) 100 mg tablet; Take 1 Tablet by mouth daily. Her last uric acid level was at 7.5 but we held off on starting a purine lowering medication since she had only had 1 flare in her lifetime. Now that she has had another flare, I am recommending that we start allopurinol. She can decide if she wants to take a full tab versus half tab daily then we will recheck levels when she is back in the fall. Follow-up and Dispositions    Return in about 6 months (around 10/20/2023) for wellness, f/u- ideally fast.           We discussed the expected course, resolution and complications of the diagnosis(es) in detail. Medication risks, benefits, costs, interactions, and alternatives were discussed as indicated. I advised her to contact the office if her condition worsens, changes or fails to improve as anticipated. She expressed understanding with the diagnosis(es) and plan. Sara Tyler is a 66 y.o. female being evaluated by a video visit encounter for concerns as above. A caregiver was present when appropriate. Due to this being a TeleHealth encounter ([facility]), evaluation of the following organ systems was limited: Vitals/Constitutional/EENT/Resp/CV/GI//MS/Neuro/Skin/Heme-Lymph-Imm. Pursuant to the emergency declaration under the [facility] waiver authority and the [employer] Act, this Virtual  Visit was conducted, with patient's (and/or legal guardian's) consent, to reduce the patient's risk of exposure to COVID-19 and provide necessary medical care.      Services were provided through a video synchronous discussion virtually to substitute for in-person clinic visit. Patient and provider were located at their individual homes. Aspects of this note have been generated using voice recognition software. Despite editing, there may be some syntax errors.     Jada Jacobs MD

## 2023-04-20 NOTE — PROGRESS NOTES
Chief Complaint   Patient presents with    Follow-up     HTN     1. \"Have you been to the ER, urgent care clinic since your last visit? Hospitalized since your last visit? \" No    2. \"Have you seen or consulted any other health care providers outside of the [address] [place] [address] since your last visit? \" Yes When: 4/13/23 Where: Dr. Suraj Ovalles, [facility] Reason for visit: cardiology follow up       3. For patients aged 39-70: Has the patient had a colonoscopy / FIT/ Cologuard? Yes - no Care Gap present      If the patient is female:    4. For patients aged 41-77: Has the patient had a mammogram within the past 2 years? Yes - no Care Gap present      5. For patients aged 21-65: Has the patient had a pap smear?  Yes - no Care Gap present   Please txt link to [phone]

## 2023-07-11 RX ORDER — PANTOPRAZOLE SODIUM 40 MG/1
TABLET, DELAYED RELEASE ORAL
Qty: 90 TABLET | Refills: 0 | Status: SHIPPED | OUTPATIENT
Start: 2023-07-11

## 2023-08-09 RX ORDER — FLUOXETINE HYDROCHLORIDE 20 MG/1
CAPSULE ORAL
Qty: 90 CAPSULE | Refills: 2 | Status: SHIPPED | OUTPATIENT
Start: 2023-08-09

## 2023-08-09 RX ORDER — ROPINIROLE 3 MG/1
TABLET, FILM COATED ORAL
Qty: 90 TABLET | Refills: 2 | Status: SHIPPED | OUTPATIENT
Start: 2023-08-09

## 2023-10-03 ENCOUNTER — NURSE ONLY (OUTPATIENT)
Facility: CLINIC | Age: 63
End: 2023-10-03
Payer: COMMERCIAL

## 2023-10-03 DIAGNOSIS — R35.0 URINE FREQUENCY: Primary | ICD-10-CM

## 2023-10-03 LAB
BILIRUBIN, URINE, POC: NEGATIVE
BLOOD URINE, POC: ABNORMAL
GLUCOSE URINE, POC: NEGATIVE
KETONES, URINE, POC: NEGATIVE
LEUKOCYTE ESTERASE, URINE, POC: ABNORMAL
NITRITE, URINE, POC: POSITIVE
PH, URINE, POC: 5.5 (ref 4.6–8)
PROTEIN,URINE, POC: ABNORMAL
SPECIFIC GRAVITY, URINE, POC: 1.01 (ref 1–1.03)
URINALYSIS CLARITY, POC: ABNORMAL
URINALYSIS COLOR, POC: YELLOW
UROBILINOGEN, POC: ABNORMAL

## 2023-10-03 PROCEDURE — 81003 URINALYSIS AUTO W/O SCOPE: CPT | Performed by: FAMILY MEDICINE

## 2023-10-04 ENCOUNTER — OFFICE VISIT (OUTPATIENT)
Facility: CLINIC | Age: 63
End: 2023-10-04
Payer: COMMERCIAL

## 2023-10-04 VITALS
BODY MASS INDEX: 32.51 KG/M2 | WEIGHT: 172.2 LBS | DIASTOLIC BLOOD PRESSURE: 72 MMHG | HEIGHT: 61 IN | TEMPERATURE: 97.4 F | RESPIRATION RATE: 20 BRPM | SYSTOLIC BLOOD PRESSURE: 130 MMHG | HEART RATE: 66 BPM | OXYGEN SATURATION: 97 %

## 2023-10-04 DIAGNOSIS — N39.0 URINARY TRACT INFECTION WITH HEMATURIA, SITE UNSPECIFIED: ICD-10-CM

## 2023-10-04 DIAGNOSIS — R31.9 URINARY TRACT INFECTION WITH HEMATURIA, SITE UNSPECIFIED: ICD-10-CM

## 2023-10-04 DIAGNOSIS — Z23 ENCOUNTER FOR IMMUNIZATION: ICD-10-CM

## 2023-10-04 DIAGNOSIS — R30.0 DYSURIA: Primary | ICD-10-CM

## 2023-10-04 DIAGNOSIS — R42 VERTIGO: ICD-10-CM

## 2023-10-04 DIAGNOSIS — R05.1 ACUTE COUGH: ICD-10-CM

## 2023-10-04 PROCEDURE — 99214 OFFICE O/P EST MOD 30 MIN: CPT | Performed by: FAMILY MEDICINE

## 2023-10-04 PROCEDURE — 3078F DIAST BP <80 MM HG: CPT | Performed by: FAMILY MEDICINE

## 2023-10-04 PROCEDURE — 90674 CCIIV4 VAC NO PRSV 0.5 ML IM: CPT | Performed by: FAMILY MEDICINE

## 2023-10-04 PROCEDURE — 3075F SYST BP GE 130 - 139MM HG: CPT | Performed by: FAMILY MEDICINE

## 2023-10-04 PROCEDURE — 90471 IMMUNIZATION ADMIN: CPT | Performed by: FAMILY MEDICINE

## 2023-10-04 RX ORDER — NITROFURANTOIN 25; 75 MG/1; MG/1
100 CAPSULE ORAL 2 TIMES DAILY
Qty: 10 CAPSULE | Refills: 0 | Status: SHIPPED | OUTPATIENT
Start: 2023-10-04 | End: 2023-10-09

## 2023-10-04 RX ORDER — SCOLOPAMINE TRANSDERMAL SYSTEM 1 MG/1
1 PATCH, EXTENDED RELEASE TRANSDERMAL
Qty: 4 PATCH | Refills: 0 | Status: SHIPPED | OUTPATIENT
Start: 2023-10-04

## 2023-10-04 RX ORDER — METOPROLOL SUCCINATE 25 MG/1
25 TABLET, EXTENDED RELEASE ORAL DAILY
Qty: 90 TABLET | Refills: 0 | OUTPATIENT
Start: 2023-10-04

## 2023-10-04 RX ORDER — ALLOPURINOL 100 MG/1
100 TABLET ORAL DAILY
Qty: 90 TABLET | Refills: 0 | Status: SHIPPED | OUTPATIENT
Start: 2023-10-04

## 2023-10-04 SDOH — ECONOMIC STABILITY: INCOME INSECURITY: HOW HARD IS IT FOR YOU TO PAY FOR THE VERY BASICS LIKE FOOD, HOUSING, MEDICAL CARE, AND HEATING?: NOT VERY HARD

## 2023-10-04 SDOH — ECONOMIC STABILITY: FOOD INSECURITY: WITHIN THE PAST 12 MONTHS, YOU WORRIED THAT YOUR FOOD WOULD RUN OUT BEFORE YOU GOT MONEY TO BUY MORE.: NEVER TRUE

## 2023-10-04 SDOH — ECONOMIC STABILITY: HOUSING INSECURITY
IN THE LAST 12 MONTHS, WAS THERE A TIME WHEN YOU DID NOT HAVE A STEADY PLACE TO SLEEP OR SLEPT IN A SHELTER (INCLUDING NOW)?: NO

## 2023-10-04 SDOH — ECONOMIC STABILITY: FOOD INSECURITY: WITHIN THE PAST 12 MONTHS, THE FOOD YOU BOUGHT JUST DIDN'T LAST AND YOU DIDN'T HAVE MONEY TO GET MORE.: NEVER TRUE

## 2023-10-04 ASSESSMENT — PATIENT HEALTH QUESTIONNAIRE - PHQ9
1. LITTLE INTEREST OR PLEASURE IN DOING THINGS: 0
SUM OF ALL RESPONSES TO PHQ QUESTIONS 1-9: 0
SUM OF ALL RESPONSES TO PHQ9 QUESTIONS 1 & 2: 0
SUM OF ALL RESPONSES TO PHQ QUESTIONS 1-9: 0
2. FEELING DOWN, DEPRESSED OR HOPELESS: 0
SUM OF ALL RESPONSES TO PHQ QUESTIONS 1-9: 0
SUM OF ALL RESPONSES TO PHQ QUESTIONS 1-9: 0

## 2023-10-06 LAB — BACTERIA UR CULT: ABNORMAL

## 2023-10-09 RX ORDER — PANTOPRAZOLE SODIUM 40 MG/1
TABLET, DELAYED RELEASE ORAL
Qty: 90 TABLET | Refills: 0 | Status: SHIPPED | OUTPATIENT
Start: 2023-10-09

## 2023-10-09 RX ORDER — METOPROLOL SUCCINATE 50 MG/1
50 TABLET, EXTENDED RELEASE ORAL DAILY
Qty: 90 TABLET | Refills: 1 | Status: SHIPPED | OUTPATIENT
Start: 2023-10-09

## 2023-10-23 ENCOUNTER — OFFICE VISIT (OUTPATIENT)
Facility: CLINIC | Age: 63
End: 2023-10-23
Payer: COMMERCIAL

## 2023-10-23 VITALS
WEIGHT: 172.2 LBS | HEART RATE: 67 BPM | RESPIRATION RATE: 20 BRPM | HEIGHT: 61 IN | BODY MASS INDEX: 32.51 KG/M2 | SYSTOLIC BLOOD PRESSURE: 130 MMHG | TEMPERATURE: 97.8 F | DIASTOLIC BLOOD PRESSURE: 80 MMHG | OXYGEN SATURATION: 98 %

## 2023-10-23 DIAGNOSIS — Z12.31 BREAST CANCER SCREENING BY MAMMOGRAM: ICD-10-CM

## 2023-10-23 DIAGNOSIS — Z00.00 WELLNESS EXAMINATION: Primary | ICD-10-CM

## 2023-10-23 DIAGNOSIS — Z23 ENCOUNTER FOR IMMUNIZATION: ICD-10-CM

## 2023-10-23 DIAGNOSIS — I10 ESSENTIAL HYPERTENSION: ICD-10-CM

## 2023-10-23 DIAGNOSIS — M1A.0710 CHRONIC GOUT OF RIGHT FOOT, UNSPECIFIED CAUSE: ICD-10-CM

## 2023-10-23 DIAGNOSIS — Z13.31 DEPRESSION SCREENING: ICD-10-CM

## 2023-10-23 DIAGNOSIS — E78.2 MIXED HYPERLIPIDEMIA: ICD-10-CM

## 2023-10-23 DIAGNOSIS — E66.09 CLASS 1 OBESITY DUE TO EXCESS CALORIES WITH SERIOUS COMORBIDITY AND BODY MASS INDEX (BMI) OF 34.0 TO 34.9 IN ADULT: ICD-10-CM

## 2023-10-23 DIAGNOSIS — Z13.220 SCREENING FOR LIPOID DISORDERS: ICD-10-CM

## 2023-10-23 DIAGNOSIS — Z11.59 SCREENING FOR VIRAL DISEASE: ICD-10-CM

## 2023-10-23 DIAGNOSIS — E03.9 ACQUIRED HYPOTHYROIDISM: ICD-10-CM

## 2023-10-23 PROBLEM — Z95.0 PACEMAKER: Status: ACTIVE | Noted: 2023-10-23

## 2023-10-23 PROCEDURE — 99396 PREV VISIT EST AGE 40-64: CPT | Performed by: FAMILY MEDICINE

## 2023-10-23 PROCEDURE — 90715 TDAP VACCINE 7 YRS/> IM: CPT | Performed by: FAMILY MEDICINE

## 2023-10-23 PROCEDURE — 3075F SYST BP GE 130 - 139MM HG: CPT | Performed by: FAMILY MEDICINE

## 2023-10-23 PROCEDURE — 90471 IMMUNIZATION ADMIN: CPT | Performed by: FAMILY MEDICINE

## 2023-10-23 PROCEDURE — 90472 IMMUNIZATION ADMIN EACH ADD: CPT | Performed by: FAMILY MEDICINE

## 2023-10-23 PROCEDURE — 90677 PCV20 VACCINE IM: CPT | Performed by: FAMILY MEDICINE

## 2023-10-23 PROCEDURE — 3079F DIAST BP 80-89 MM HG: CPT | Performed by: FAMILY MEDICINE

## 2023-10-23 PROCEDURE — 99214 OFFICE O/P EST MOD 30 MIN: CPT | Performed by: FAMILY MEDICINE

## 2023-10-23 RX ORDER — THYROID 90 MG/1
TABLET ORAL
Qty: 90 TABLET | Refills: 3 | Status: SHIPPED | OUTPATIENT
Start: 2023-10-23

## 2023-10-23 ASSESSMENT — PATIENT HEALTH QUESTIONNAIRE - PHQ9
SUM OF ALL RESPONSES TO PHQ QUESTIONS 1-9: 0
SUM OF ALL RESPONSES TO PHQ QUESTIONS 1-9: 0
2. FEELING DOWN, DEPRESSED OR HOPELESS: 0
SUM OF ALL RESPONSES TO PHQ QUESTIONS 1-9: 0
SUM OF ALL RESPONSES TO PHQ9 QUESTIONS 1 & 2: 0
1. LITTLE INTEREST OR PLEASURE IN DOING THINGS: 0
SUM OF ALL RESPONSES TO PHQ QUESTIONS 1-9: 0

## 2023-10-24 LAB
ALBUMIN SERPL-MCNC: 4.6 G/DL (ref 3.9–4.9)
ALBUMIN/GLOB SERPL: 1.7 {RATIO} (ref 1.2–2.2)
ALP SERPL-CCNC: 79 IU/L (ref 44–121)
ALT SERPL-CCNC: 18 IU/L (ref 0–32)
AST SERPL-CCNC: 20 IU/L (ref 0–40)
BASOPHILS # BLD AUTO: 0.1 X10E3/UL (ref 0–0.2)
BASOPHILS NFR BLD AUTO: 1 %
BILIRUB SERPL-MCNC: 0.2 MG/DL (ref 0–1.2)
BUN SERPL-MCNC: 22 MG/DL (ref 8–27)
BUN/CREAT SERPL: 21 (ref 12–28)
CALCIUM SERPL-MCNC: 10 MG/DL (ref 8.7–10.3)
CHLORIDE SERPL-SCNC: 100 MMOL/L (ref 96–106)
CHOLEST SERPL-MCNC: 242 MG/DL (ref 100–199)
CO2 SERPL-SCNC: 22 MMOL/L (ref 20–29)
CREAT SERPL-MCNC: 1.04 MG/DL (ref 0.57–1)
EGFRCR SERPLBLD CKD-EPI 2021: 61 ML/MIN/1.73
EOSINOPHIL # BLD AUTO: 0.2 X10E3/UL (ref 0–0.4)
EOSINOPHIL NFR BLD AUTO: 4 %
ERYTHROCYTE [DISTWIDTH] IN BLOOD BY AUTOMATED COUNT: 13.2 % (ref 11.7–15.4)
GLOBULIN SER CALC-MCNC: 2.7 G/DL (ref 1.5–4.5)
GLUCOSE SERPL-MCNC: 88 MG/DL (ref 70–99)
HCT VFR BLD AUTO: 38.8 % (ref 34–46.6)
HDLC SERPL-MCNC: 55 MG/DL
HGB BLD-MCNC: 13.2 G/DL (ref 11.1–15.9)
IMM GRANULOCYTES # BLD AUTO: 0 X10E3/UL (ref 0–0.1)
IMM GRANULOCYTES NFR BLD AUTO: 0 %
LDLC SERPL CALC-MCNC: 159 MG/DL (ref 0–99)
LYMPHOCYTES # BLD AUTO: 2.8 X10E3/UL (ref 0.7–3.1)
LYMPHOCYTES NFR BLD AUTO: 49 %
MCH RBC QN AUTO: 29.9 PG (ref 26.6–33)
MCHC RBC AUTO-ENTMCNC: 34 G/DL (ref 31.5–35.7)
MCV RBC AUTO: 88 FL (ref 79–97)
MONOCYTES # BLD AUTO: 0.5 X10E3/UL (ref 0.1–0.9)
MONOCYTES NFR BLD AUTO: 8 %
NEUTROPHILS # BLD AUTO: 2.2 X10E3/UL (ref 1.4–7)
NEUTROPHILS NFR BLD AUTO: 38 %
PLATELET # BLD AUTO: 243 X10E3/UL (ref 150–450)
POTASSIUM SERPL-SCNC: 4.5 MMOL/L (ref 3.5–5.2)
PROT SERPL-MCNC: 7.3 G/DL (ref 6–8.5)
RBC # BLD AUTO: 4.41 X10E6/UL (ref 3.77–5.28)
SODIUM SERPL-SCNC: 138 MMOL/L (ref 134–144)
TRIGL SERPL-MCNC: 157 MG/DL (ref 0–149)
TSH SERPL DL<=0.005 MIU/L-ACNC: 2.49 UIU/ML (ref 0.45–4.5)
URATE SERPL-MCNC: 6.6 MG/DL (ref 3–7.2)
VLDLC SERPL CALC-MCNC: 28 MG/DL (ref 5–40)
WBC # BLD AUTO: 5.8 X10E3/UL (ref 3.4–10.8)

## 2023-12-29 RX ORDER — ALLOPURINOL 100 MG/1
100 TABLET ORAL DAILY
Qty: 90 TABLET | Refills: 2 | Status: SHIPPED | OUTPATIENT
Start: 2023-12-29

## 2024-05-13 RX ORDER — FLUOXETINE HYDROCHLORIDE 20 MG/1
CAPSULE ORAL
Qty: 90 CAPSULE | Refills: 1 | Status: SHIPPED | OUTPATIENT
Start: 2024-05-13

## 2024-05-13 RX ORDER — METOPROLOL SUCCINATE 50 MG/1
50 TABLET, EXTENDED RELEASE ORAL DAILY
Qty: 90 TABLET | Refills: 1 | Status: SHIPPED | OUTPATIENT
Start: 2024-05-13

## 2024-05-13 RX ORDER — ROPINIROLE 3 MG/1
TABLET, FILM COATED ORAL
Qty: 90 TABLET | Refills: 1 | Status: SHIPPED | OUTPATIENT
Start: 2024-05-13

## 2024-10-09 RX ORDER — ALLOPURINOL 100 MG/1
100 TABLET ORAL DAILY
Qty: 90 TABLET | Refills: 0 | Status: SHIPPED | OUTPATIENT
Start: 2024-10-09

## 2024-10-28 ENCOUNTER — OFFICE VISIT (OUTPATIENT)
Facility: CLINIC | Age: 64
End: 2024-10-28
Payer: COMMERCIAL

## 2024-10-28 VITALS
TEMPERATURE: 98.6 F | SYSTOLIC BLOOD PRESSURE: 128 MMHG | BODY MASS INDEX: 33.42 KG/M2 | WEIGHT: 177 LBS | DIASTOLIC BLOOD PRESSURE: 80 MMHG | OXYGEN SATURATION: 98 % | HEART RATE: 77 BPM | HEIGHT: 61 IN

## 2024-10-28 DIAGNOSIS — E78.2 MIXED HYPERLIPIDEMIA: ICD-10-CM

## 2024-10-28 DIAGNOSIS — I42.2 OTHER HYPERTROPHIC CARDIOMYOPATHY (HCC): ICD-10-CM

## 2024-10-28 DIAGNOSIS — I10 ESSENTIAL HYPERTENSION: ICD-10-CM

## 2024-10-28 DIAGNOSIS — Z13.220 SCREENING FOR LIPOID DISORDERS: ICD-10-CM

## 2024-10-28 DIAGNOSIS — Z12.31 BREAST CANCER SCREENING BY MAMMOGRAM: ICD-10-CM

## 2024-10-28 DIAGNOSIS — E66.811 CLASS 1 OBESITY DUE TO EXCESS CALORIES WITH SERIOUS COMORBIDITY AND BODY MASS INDEX (BMI) OF 34.0 TO 34.9 IN ADULT: ICD-10-CM

## 2024-10-28 DIAGNOSIS — E03.9 ACQUIRED HYPOTHYROIDISM: ICD-10-CM

## 2024-10-28 DIAGNOSIS — Z23 ENCOUNTER FOR IMMUNIZATION: ICD-10-CM

## 2024-10-28 DIAGNOSIS — M1A.0710 CHRONIC GOUT OF RIGHT FOOT, UNSPECIFIED CAUSE: ICD-10-CM

## 2024-10-28 DIAGNOSIS — E66.09 CLASS 1 OBESITY DUE TO EXCESS CALORIES WITH SERIOUS COMORBIDITY AND BODY MASS INDEX (BMI) OF 34.0 TO 34.9 IN ADULT: ICD-10-CM

## 2024-10-28 DIAGNOSIS — Z00.00 WELLNESS EXAMINATION: Primary | ICD-10-CM

## 2024-10-28 DIAGNOSIS — F32.A DEPRESSIVE DISORDER: ICD-10-CM

## 2024-10-28 PROCEDURE — 99396 PREV VISIT EST AGE 40-64: CPT | Performed by: FAMILY MEDICINE

## 2024-10-28 PROCEDURE — 3074F SYST BP LT 130 MM HG: CPT | Performed by: FAMILY MEDICINE

## 2024-10-28 PROCEDURE — 3079F DIAST BP 80-89 MM HG: CPT | Performed by: FAMILY MEDICINE

## 2024-10-28 PROCEDURE — 99214 OFFICE O/P EST MOD 30 MIN: CPT | Performed by: FAMILY MEDICINE

## 2024-10-28 PROCEDURE — 90471 IMMUNIZATION ADMIN: CPT | Performed by: FAMILY MEDICINE

## 2024-10-28 PROCEDURE — 90661 CCIIV3 VAC ABX FR 0.5 ML IM: CPT | Performed by: FAMILY MEDICINE

## 2024-10-28 RX ORDER — TOLTERODINE 4 MG/1
4 CAPSULE, EXTENDED RELEASE ORAL DAILY
COMMUNITY
Start: 2024-10-08

## 2024-10-28 RX ORDER — FLUOXETINE 40 MG/1
40 CAPSULE ORAL DAILY
Qty: 90 CAPSULE | Refills: 3 | Status: SHIPPED | OUTPATIENT
Start: 2024-10-28

## 2024-10-28 RX ORDER — ALLOPURINOL 100 MG/1
100 TABLET ORAL DAILY
Qty: 90 TABLET | Refills: 3 | Status: SHIPPED | OUTPATIENT
Start: 2024-10-28 | End: 2024-10-30 | Stop reason: SDUPTHER

## 2024-10-28 RX ORDER — METOPROLOL SUCCINATE 25 MG/1
25 TABLET, EXTENDED RELEASE ORAL DAILY
Qty: 90 TABLET | Refills: 1 | Status: SHIPPED | OUTPATIENT
Start: 2024-10-28

## 2024-10-28 RX ORDER — DISOPYRAMIDE PHOSPHATE 100 MG/1
100 CAPSULE ORAL 2 TIMES DAILY
COMMUNITY
Start: 2024-10-08

## 2024-10-28 RX ORDER — THYROID 90 MG/1
TABLET ORAL
Qty: 90 TABLET | Refills: 3 | Status: SHIPPED | OUTPATIENT
Start: 2024-10-28 | End: 2024-10-30 | Stop reason: DRUGHIGH

## 2024-10-28 SDOH — ECONOMIC STABILITY: FOOD INSECURITY: WITHIN THE PAST 12 MONTHS, YOU WORRIED THAT YOUR FOOD WOULD RUN OUT BEFORE YOU GOT MONEY TO BUY MORE.: NEVER TRUE

## 2024-10-28 SDOH — ECONOMIC STABILITY: FOOD INSECURITY: WITHIN THE PAST 12 MONTHS, THE FOOD YOU BOUGHT JUST DIDN'T LAST AND YOU DIDN'T HAVE MONEY TO GET MORE.: NEVER TRUE

## 2024-10-28 SDOH — ECONOMIC STABILITY: INCOME INSECURITY: HOW HARD IS IT FOR YOU TO PAY FOR THE VERY BASICS LIKE FOOD, HOUSING, MEDICAL CARE, AND HEATING?: NOT HARD AT ALL

## 2024-10-28 ASSESSMENT — PATIENT HEALTH QUESTIONNAIRE - PHQ9
7. TROUBLE CONCENTRATING ON THINGS, SUCH AS READING THE NEWSPAPER OR WATCHING TELEVISION: NEARLY EVERY DAY
4. FEELING TIRED OR HAVING LITTLE ENERGY: NEARLY EVERY DAY
8. MOVING OR SPEAKING SO SLOWLY THAT OTHER PEOPLE COULD HAVE NOTICED. OR THE OPPOSITE, BEING SO FIGETY OR RESTLESS THAT YOU HAVE BEEN MOVING AROUND A LOT MORE THAN USUAL: NOT AT ALL
SUM OF ALL RESPONSES TO PHQ QUESTIONS 1-9: 11
5. POOR APPETITE OR OVEREATING: NOT AT ALL
10. IF YOU CHECKED OFF ANY PROBLEMS, HOW DIFFICULT HAVE THESE PROBLEMS MADE IT FOR YOU TO DO YOUR WORK, TAKE CARE OF THINGS AT HOME, OR GET ALONG WITH OTHER PEOPLE: SOMEWHAT DIFFICULT
1. LITTLE INTEREST OR PLEASURE IN DOING THINGS: SEVERAL DAYS
2. FEELING DOWN, DEPRESSED OR HOPELESS: SEVERAL DAYS
6. FEELING BAD ABOUT YOURSELF - OR THAT YOU ARE A FAILURE OR HAVE LET YOURSELF OR YOUR FAMILY DOWN: NOT AT ALL
SUM OF ALL RESPONSES TO PHQ QUESTIONS 1-9: 11
SUM OF ALL RESPONSES TO PHQ QUESTIONS 1-9: 11
SUM OF ALL RESPONSES TO PHQ9 QUESTIONS 1 & 2: 2
3. TROUBLE FALLING OR STAYING ASLEEP: NEARLY EVERY DAY
SUM OF ALL RESPONSES TO PHQ QUESTIONS 1-9: 11
9. THOUGHTS THAT YOU WOULD BE BETTER OFF DEAD, OR OF HURTING YOURSELF: NOT AT ALL

## 2024-10-28 ASSESSMENT — COLUMBIA-SUICIDE SEVERITY RATING SCALE - C-SSRS
2. HAVE YOU ACTUALLY HAD ANY THOUGHTS OF KILLING YOURSELF?: NO
6. HAVE YOU EVER DONE ANYTHING, STARTED TO DO ANYTHING, OR PREPARED TO DO ANYTHING TO END YOUR LIFE?: NO
1. WITHIN THE PAST MONTH, HAVE YOU WISHED YOU WERE DEAD OR WISHED YOU COULD GO TO SLEEP AND NOT WAKE UP?: NO

## 2024-10-28 NOTE — ASSESSMENT & PLAN NOTE
Patient reports taking medication daily as directed.  We are checking annual level with labs.    Orders:    Thyroid Cascade Profile    thyroid (ARMOUR) 90 MG tablet; TAKE ONE TABLET BY MOUTH EVERY DAY IN THE MORNING

## 2024-10-28 NOTE — ASSESSMENT & PLAN NOTE
1 flare since last visit that was mild.  Continue allopurinol.  We will check uric acid level and make adjustment if necessary.  Orders:    Uric Acid    allopurinol (ZYLOPRIM) 100 MG tablet; Take 1 tablet by mouth daily

## 2024-10-28 NOTE — PROGRESS NOTES
Chief Complaint   Patient presents with    Annual Exam     \"Have you been to the ER, urgent care clinic since your last visit?  Hospitalized since your last visit?\"    NO    “Have you seen or consulted any other health care providers outside of [facility] since your last visit?”    YES - When: approximately 4 months ago.  Where and Why: Cardiology for routine visit.     /80 (Site: Left Upper Arm, Position: Sitting)   Pulse 77   Temp 98.6 °F (37 °C) (Temporal)   Ht 1.537 m (5' 0.5\")   Wt 80.3 kg (177 lb)   SpO2 98%   BMI 34.00 kg/m²    PHQ-9 Total Score: 11 (10/28/2024  7:38 AM)  Thoughts that you would be better off dead, or of hurting yourself in some way: 0 (10/28/2024  7:38 AM)       Have you had a mammogram?”   YES - Where: [facility]  Nurse/CMA to request most recent records if not in the chart    Date of last Mammogram: 6/3/2022             Click Here for Release of Records Request     Identified Patient with 2 Patient Identifiers-Name and   
MORNING    Chronic gout of right foot, unspecified cause  1 flare since last visit that was mild.  Continue allopurinol.  We will check uric acid level and make adjustment if necessary.  Orders:    Uric Acid    allopurinol (ZYLOPRIM) 100 MG tablet; Take 1 tablet by mouth daily    Depressive disorder  Currently uncontrolled.  We will increase fluoxetine to next dose.  She will look to resume exercise as soon as she can.  Orders:    FLUoxetine (PROZAC) 40 MG capsule; Take 1 capsule by mouth daily        Aspects of this note have been generated using voice recognition software. Despite editing, there may be some syntax errors.    Divya Rodríguez MD

## 2024-10-28 NOTE — ASSESSMENT & PLAN NOTE
She notes that she was doing well having activity after her 's intermediate but since his injury that has ceased.  She will strive to get back into previous activity levels.

## 2024-10-28 NOTE — ASSESSMENT & PLAN NOTE
Currently uncontrolled.  We will increase fluoxetine to next dose.  She will look to resume exercise as soon as she can.  Orders:    FLUoxetine (PROZAC) 40 MG capsule; Take 1 capsule by mouth daily

## 2024-10-28 NOTE — ASSESSMENT & PLAN NOTE
Currently lifestyle controlled for this history.  We will check levels and adjust if necessary.    Orders:    Lipid Panel    CBC with Auto Differential    Comprehensive Metabolic Panel

## 2024-10-28 NOTE — ASSESSMENT & PLAN NOTE
Blood pressure is currently controlled.  Continue current medication regimen.  Return for recheck in 6 months or sooner if needed.    Orders:    metoprolol succinate (TOPROL XL) 25 MG extended release tablet; Take 1 tablet by mouth daily

## 2024-10-29 LAB
ALBUMIN SERPL-MCNC: 4.6 G/DL (ref 3.9–4.9)
ALP SERPL-CCNC: 72 IU/L (ref 44–121)
ALT SERPL-CCNC: 20 IU/L (ref 0–32)
AST SERPL-CCNC: 18 IU/L (ref 0–40)
BASOPHILS # BLD AUTO: 0.1 X10E3/UL (ref 0–0.2)
BASOPHILS NFR BLD AUTO: 1 %
BILIRUB SERPL-MCNC: 0.3 MG/DL (ref 0–1.2)
BUN SERPL-MCNC: 20 MG/DL (ref 8–27)
BUN/CREAT SERPL: 22 (ref 12–28)
CALCIUM SERPL-MCNC: 9.7 MG/DL (ref 8.7–10.3)
CHLORIDE SERPL-SCNC: 102 MMOL/L (ref 96–106)
CHOLEST SERPL-MCNC: 263 MG/DL (ref 100–199)
CO2 SERPL-SCNC: 24 MMOL/L (ref 20–29)
COMMENT: NORMAL
CREAT SERPL-MCNC: 0.89 MG/DL (ref 0.57–1)
EGFRCR SERPLBLD CKD-EPI 2021: 73 ML/MIN/1.73
EOSINOPHIL # BLD AUTO: 0.2 X10E3/UL (ref 0–0.4)
EOSINOPHIL NFR BLD AUTO: 3 %
ERYTHROCYTE [DISTWIDTH] IN BLOOD BY AUTOMATED COUNT: 13.2 % (ref 11.7–15.4)
GLOBULIN SER CALC-MCNC: 2.1 G/DL (ref 1.5–4.5)
GLUCOSE SERPL-MCNC: 87 MG/DL (ref 70–99)
HCT VFR BLD AUTO: 40.7 % (ref 34–46.6)
HDLC SERPL-MCNC: 55 MG/DL
HGB BLD-MCNC: 13.6 G/DL (ref 11.1–15.9)
IMM GRANULOCYTES # BLD AUTO: 0 X10E3/UL (ref 0–0.1)
IMM GRANULOCYTES NFR BLD AUTO: 0 %
LDLC SERPL CALC-MCNC: 164 MG/DL (ref 0–99)
LYMPHOCYTES # BLD AUTO: 2.9 X10E3/UL (ref 0.7–3.1)
LYMPHOCYTES NFR BLD AUTO: 47 %
MCH RBC QN AUTO: 30.6 PG (ref 26.6–33)
MCHC RBC AUTO-ENTMCNC: 33.4 G/DL (ref 31.5–35.7)
MCV RBC AUTO: 92 FL (ref 79–97)
MONOCYTES # BLD AUTO: 0.5 X10E3/UL (ref 0.1–0.9)
MONOCYTES NFR BLD AUTO: 8 %
NEUTROPHILS # BLD AUTO: 2.5 X10E3/UL (ref 1.4–7)
NEUTROPHILS NFR BLD AUTO: 41 %
PLATELET # BLD AUTO: 248 X10E3/UL (ref 150–450)
POTASSIUM SERPL-SCNC: 4.3 MMOL/L (ref 3.5–5.2)
PROT SERPL-MCNC: 6.7 G/DL (ref 6–8.5)
RBC # BLD AUTO: 4.45 X10E6/UL (ref 3.77–5.28)
SODIUM SERPL-SCNC: 140 MMOL/L (ref 134–144)
T4 FREE SERPL-MCNC: 1.03 NG/DL (ref 0.82–1.77)
THYROPEROXIDASE AB SERPL-ACNC: <9 IU/ML (ref 0–34)
TRIGL SERPL-MCNC: 239 MG/DL (ref 0–149)
TSH SERPL DL<=0.005 MIU/L-ACNC: 12.7 UIU/ML (ref 0.45–4.5)
URATE SERPL-MCNC: 8 MG/DL (ref 3–7.2)
VLDLC SERPL CALC-MCNC: 44 MG/DL (ref 5–40)
WBC # BLD AUTO: 6.1 X10E3/UL (ref 3.4–10.8)

## 2024-10-30 DIAGNOSIS — E03.9 ACQUIRED HYPOTHYROIDISM: Primary | ICD-10-CM

## 2024-10-30 DIAGNOSIS — M1A.0710 CHRONIC GOUT OF RIGHT FOOT, UNSPECIFIED CAUSE: ICD-10-CM

## 2024-10-30 RX ORDER — ALLOPURINOL 100 MG/1
200 TABLET ORAL DAILY
Qty: 180 TABLET | Refills: 3 | Status: SHIPPED | OUTPATIENT
Start: 2024-10-30

## 2024-10-30 RX ORDER — THYROID 120 MG/1
120 TABLET ORAL DAILY
Qty: 90 TABLET | Refills: 0 | Status: SHIPPED | OUTPATIENT
Start: 2024-10-30

## 2025-02-20 DIAGNOSIS — E03.9 ACQUIRED HYPOTHYROIDISM: ICD-10-CM

## 2025-02-20 RX ORDER — THYROID, PORCINE 120 MG/1
120 TABLET ORAL DAILY
Qty: 90 TABLET | Refills: 0 | Status: SHIPPED | OUTPATIENT
Start: 2025-02-20

## 2025-05-09 SDOH — ECONOMIC STABILITY: FOOD INSECURITY: WITHIN THE PAST 12 MONTHS, YOU WORRIED THAT YOUR FOOD WOULD RUN OUT BEFORE YOU GOT MONEY TO BUY MORE.: NEVER TRUE

## 2025-05-09 SDOH — ECONOMIC STABILITY: INCOME INSECURITY: IN THE LAST 12 MONTHS, WAS THERE A TIME WHEN YOU WERE NOT ABLE TO PAY THE MORTGAGE OR RENT ON TIME?: NO

## 2025-05-09 SDOH — ECONOMIC STABILITY: TRANSPORTATION INSECURITY
IN THE PAST 12 MONTHS, HAS THE LACK OF TRANSPORTATION KEPT YOU FROM MEDICAL APPOINTMENTS OR FROM GETTING MEDICATIONS?: NO

## 2025-05-09 SDOH — ECONOMIC STABILITY: FOOD INSECURITY: WITHIN THE PAST 12 MONTHS, THE FOOD YOU BOUGHT JUST DIDN'T LAST AND YOU DIDN'T HAVE MONEY TO GET MORE.: NEVER TRUE

## 2025-05-09 SDOH — ECONOMIC STABILITY: TRANSPORTATION INSECURITY
IN THE PAST 12 MONTHS, HAS LACK OF TRANSPORTATION KEPT YOU FROM MEETINGS, WORK, OR FROM GETTING THINGS NEEDED FOR DAILY LIVING?: NO

## 2025-05-12 ENCOUNTER — OFFICE VISIT (OUTPATIENT)
Facility: CLINIC | Age: 65
End: 2025-05-12
Payer: COMMERCIAL

## 2025-05-12 VITALS
HEART RATE: 77 BPM | OXYGEN SATURATION: 98 % | TEMPERATURE: 97.2 F | HEIGHT: 61 IN | RESPIRATION RATE: 20 BRPM | DIASTOLIC BLOOD PRESSURE: 80 MMHG | SYSTOLIC BLOOD PRESSURE: 142 MMHG | BODY MASS INDEX: 34.85 KG/M2 | WEIGHT: 184.6 LBS

## 2025-05-12 DIAGNOSIS — E03.9 ACQUIRED HYPOTHYROIDISM: ICD-10-CM

## 2025-05-12 DIAGNOSIS — I42.2 OTHER HYPERTROPHIC CARDIOMYOPATHY (HCC): ICD-10-CM

## 2025-05-12 DIAGNOSIS — M1A.0710 CHRONIC GOUT OF RIGHT FOOT, UNSPECIFIED CAUSE: ICD-10-CM

## 2025-05-12 DIAGNOSIS — R25.1 SHAKINESS: ICD-10-CM

## 2025-05-12 DIAGNOSIS — I10 ESSENTIAL HYPERTENSION: Primary | ICD-10-CM

## 2025-05-12 PROCEDURE — 3077F SYST BP >= 140 MM HG: CPT | Performed by: FAMILY MEDICINE

## 2025-05-12 PROCEDURE — 99214 OFFICE O/P EST MOD 30 MIN: CPT | Performed by: FAMILY MEDICINE

## 2025-05-12 PROCEDURE — 3079F DIAST BP 80-89 MM HG: CPT | Performed by: FAMILY MEDICINE

## 2025-05-12 RX ORDER — MAVACAMTEN 5 MG/1
10 CAPSULE, GELATIN COATED ORAL DAILY
COMMUNITY
Start: 2025-04-03

## 2025-05-12 ASSESSMENT — PATIENT HEALTH QUESTIONNAIRE - PHQ9
SUM OF ALL RESPONSES TO PHQ QUESTIONS 1-9: 0
1. LITTLE INTEREST OR PLEASURE IN DOING THINGS: NOT AT ALL
2. FEELING DOWN, DEPRESSED OR HOPELESS: NOT AT ALL

## 2025-05-12 NOTE — PROGRESS NOTES
Chief Complaint   Patient presents with    Follow-up Chronic Condition         Subjective   History of Present Illness  The patient is a 65-year-old female presenting for follow-up.    Thyroid and Allopurinol Medication  - She adheres to her thyroid medication and allopurinol regimen, with no issues since the last adjustment in late October.  - She had to get the allopurinol cleared by the pharmacist due to taking Camzyos.  - She is now on a higher dose of allopurinol.  - She experienced a brief gout episode after consuming seafood in April, which resolved spontaneously.    Cardiomyopathy  - She is under cardiology care for cardiomyopathy, with her last consultation on Friday.  - Her initial Camzyos dosage of 5 mg was increased to 10 mg after dyspnea and chest pressure during a beach visit.  - She has had four echocardiograms this year, with monthly monitoring required.  - Her last echocardiogram showed a gradient over 30, improved from 44 but still above the desired level of under 20.  - She acknowledges the need for increased physical activity but finds it challenging due to symptoms.  - She has resumed walking with her .  - Her cardiologist noted an improvement in her heart murmur.    Suspected hypoglycemic Episodes  - She experiences daily hypoglycemic episodes, up from once or twice a month.  - These episodes are accompanied by excessive sweating and a compulsion to eat, with persistent shakiness postprandially.  - She has gained 10 pounds, uncertain if due to medication or increased food intake.  - Her non-fasting glucose was 113 on Friday.  - She declined adjusting her metoprolol dosage due to previous adverse effects at higher doses.    Supplemental information: None.       Results  Labs  - Non-fasting glucose: 113    Prior to Admission medications    Medication Sig Start Date End Date Taking? Authorizing Provider   CAMZYOS 5 MG CAPS Take 10 mg by mouth daily 4/3/25  Yes Provider, MD Sarah

## 2025-05-12 NOTE — PROGRESS NOTES
The patient, Klaudia White, identity was verified by name and MRN.  Chief Complaint   Patient presents with    Follow-up Chronic Condition     No LMP recorded.  BP (!) 142/80 (BP Site: Right Upper Arm, Patient Position: Sitting, BP Cuff Size: Medium Adult)   Pulse 77   Temp 97.2 °F (36.2 °C) (Temporal)   Resp 20   Ht 1.537 m (5' 0.5\")   Wt 83.7 kg (184 lb 9.6 oz)   SpO2 98%   BMI 35.46 kg/m²       4/11/2022     9:03 AM   Amb Fall Risk Assessment and TUG Test   Fall in past 12 months? 0   Able to walk? Yes     PHQ-9 Total Score: 0 (5/12/2025 11:30 AM)    \"Have you been to the ER, urgent care clinic since your last visit?  Hospitalized since your last visit?\"    NO    “Have you seen or consulted any other health care providers outside our system since your last visit?”    NO    Have you had a mammogram?”   NO  VPFW  Date of last Mammogram: 6/3/2022              Social History     Substance and Sexual Activity   Sexual Activity Yes    Partners: Male     Medication list reviewed and active medications noted. Patient is taking medications as directed.  See documentation in medication activity.  Allergies: allergy list reviewed, no new allergies added    [facility] Social Drivers Of Health ([facility]) Screening Questionnaire       Question 5/9/2025  7:40 AM EDT - Filed by Patient    Within the past 12 months, you worried that your food would run out before you got the money to buy more. Never true    Within the past 12 months, the food you bought just didn't last and you didn't have money to get more. Never true    In the past 12 months, has lack of transportation kept you from medical appointments or from getting medications? No    In the past 12 months, has lack of transportation kept you from meetings, work, or from getting things needed for daily living? No    In the last 12 months, was there a time when you were not able to pay the mortgage or rent on time? No    In the past 12 months, how many times have you

## 2025-05-13 ENCOUNTER — TELEPHONE (OUTPATIENT)
Facility: CLINIC | Age: 65
End: 2025-05-13

## 2025-05-13 ENCOUNTER — RESULTS FOLLOW-UP (OUTPATIENT)
Facility: CLINIC | Age: 65
End: 2025-05-13

## 2025-05-13 LAB
TSH SERPL DL<=0.005 MIU/L-ACNC: 0.53 UIU/ML (ref 0.45–4.5)
URATE SERPL-MCNC: 6.9 MG/DL (ref 3–7.2)

## 2025-05-13 NOTE — TELEPHONE ENCOUNTER
Patient called in and said that when she came into the office yesterday to see  she gave her a [employer] eleonora monitor I believe and she said that it was working perfectly fine until she just got out of the shower and it fell off she put it back on but now it is not reading anything or working properly. She would like to know if she can come by the office to get another one.

## 2025-05-14 DIAGNOSIS — I10 ESSENTIAL HYPERTENSION: ICD-10-CM

## 2025-05-14 DIAGNOSIS — E03.9 ACQUIRED HYPOTHYROIDISM: ICD-10-CM

## 2025-05-14 RX ORDER — METOPROLOL SUCCINATE 25 MG/1
25 TABLET, EXTENDED RELEASE ORAL DAILY
Qty: 90 TABLET | Refills: 1 | Status: SHIPPED | OUTPATIENT
Start: 2025-05-14

## 2025-05-14 RX ORDER — THYROID, PORCINE 120 MG/1
120 TABLET ORAL DAILY
Qty: 90 TABLET | Refills: 3 | Status: SHIPPED | OUTPATIENT
Start: 2025-05-14

## 2025-05-29 ENCOUNTER — PATIENT MESSAGE (OUTPATIENT)
Facility: CLINIC | Age: 65
End: 2025-05-29

## 2025-05-29 DIAGNOSIS — M1A.0710 CHRONIC GOUT OF RIGHT FOOT, UNSPECIFIED CAUSE: Primary | ICD-10-CM

## 2025-05-29 RX ORDER — DICLOFENAC SODIUM 75 MG/1
75 TABLET, DELAYED RELEASE ORAL 2 TIMES DAILY PRN
Qty: 60 TABLET | Refills: 0 | Status: SHIPPED | OUTPATIENT
Start: 2025-05-29

## 2025-06-04 ENCOUNTER — PATIENT MESSAGE (OUTPATIENT)
Facility: CLINIC | Age: 65
End: 2025-06-04

## 2025-06-04 NOTE — TELEPHONE ENCOUNTER
Patient called into the office and was scheduled for   6/5/2025 11:00 AM Kera Robison APRN - CNP       Close enc

## 2025-06-05 ENCOUNTER — OFFICE VISIT (OUTPATIENT)
Facility: CLINIC | Age: 65
End: 2025-06-05
Payer: COMMERCIAL

## 2025-06-05 VITALS
HEIGHT: 61 IN | TEMPERATURE: 97.3 F | BODY MASS INDEX: 35.5 KG/M2 | SYSTOLIC BLOOD PRESSURE: 120 MMHG | WEIGHT: 188 LBS | DIASTOLIC BLOOD PRESSURE: 92 MMHG | OXYGEN SATURATION: 98 % | HEART RATE: 81 BPM | RESPIRATION RATE: 16 BRPM

## 2025-06-05 DIAGNOSIS — M10.072 ACUTE IDIOPATHIC GOUT INVOLVING TOE OF LEFT FOOT: Primary | ICD-10-CM

## 2025-06-05 PROCEDURE — 99213 OFFICE O/P EST LOW 20 MIN: CPT

## 2025-06-05 PROCEDURE — 3074F SYST BP LT 130 MM HG: CPT

## 2025-06-05 PROCEDURE — 3080F DIAST BP >= 90 MM HG: CPT

## 2025-06-05 RX ORDER — COLCHICINE 0.6 MG/1
TABLET ORAL
Qty: 30 TABLET | Refills: 3 | Status: SHIPPED | OUTPATIENT
Start: 2025-06-05

## 2025-06-05 RX ORDER — ALLOPURINOL 300 MG/1
300 TABLET ORAL DAILY
Qty: 90 TABLET | Refills: 3 | Status: SHIPPED | OUTPATIENT
Start: 2025-06-05

## 2025-06-05 RX ORDER — PREDNISONE 20 MG/1
40 TABLET ORAL DAILY
Qty: 10 TABLET | Refills: 0 | Status: SHIPPED | OUTPATIENT
Start: 2025-06-05 | End: 2025-06-10

## 2025-06-05 NOTE — PROGRESS NOTES
Acute Office Visit    Subjective  Chief Complaint   Patient presents with    Gout     In L big toe. Wed before memorial day went to  got a steroid pack. Thursday started again was sent diclofenac and it masks it but not gone. Its not red or hot but sensitive. Doing the pills cherry juice drinking water and eposen salt baths.      HPI:  Klaudia White is a 65 y.o. female. Patient has been having issues with gout since May 22, she got a steroid from [facility] that helped briefly. She has been taking Voltaren since but pain has returned and is getting progressively worse. Patient had not had issues with gout since 2022. She states she did go to the beach recently and ate a large amount of seafood.    Past Medical History:   Diagnosis Date    Acquired hypothyroidism     Cancer (HCC)     skin    Depressive disorder     Essential hypertension     Eye problems     Heart murmur     Mixed hyperlipidemia     Restless legs     Thyroid disease     Urinary incontinence, mixed      Past Surgical History:   Procedure Laterality Date    BLADDER REPAIR      by suprapubic sling    COLONOSCOPY  02/15/2022    1 small polyp repeat in 5 years     COLONOSCOPY  2014    HYSTERECTOMY (CERVIX STATUS UNKNOWN)  10/19/2011    HYSTERECTOMY (CERVIX STATUS UNKNOWN)  10/2011    PACEMAKER PLACEMENT      TUBAL LIGATION      TUBAL LIGATION            Current Outpatient Medications on File Prior to Visit   Medication Sig Dispense Refill    diclofenac (VOLTAREN) 75 MG EC tablet Take 1 tablet by mouth 2 times daily as needed for Pain 60 tablet 0    thyroid (ARMOUR THYROID) 120 MG tablet Take 1 tablet by mouth daily 90 tablet 3    metoprolol succinate (TOPROL XL) 25 MG extended release tablet Take 1 tablet by mouth daily 90 tablet 1    CAMZYOS 5 MG CAPS Take 10 mg by mouth daily      disopyramide (NORPACE) 100 MG capsule Take 1 capsule by mouth in the morning and at bedtime      tolterodine (DETROL LA) 4 MG extended release capsule Take 1 
The patient, Klaudia White, identity was verified by name and MRN.  Chief Complaint   Patient presents with    Gout     In L big toe. Wed before memorial day went to  got a steroid pack. Thursday started again was sent diclofenac and it masks it but not gone. Its not red or hot but sensitive. Doing the pills cherry juice drinking water and eposen salt baths.      No LMP recorded.  Resp 16   Ht 1.537 m (5' 0.5\")   Wt 85.3 kg (188 lb)   BMI 36.11 kg/m²       4/11/2022     9:03 AM   Amb Fall Risk Assessment and TUG Test   Fall in past 12 months? 0   Able to walk? Yes     No data recorded  \"Have you been to the ER, urgent care clinic since your last visit?  Hospitalized since your last visit?\"    YES - When: approximately 1  weeks ago.  Where and Why: [facility] for gout.    “Have you seen or consulted any other health care providers outside our system since your last visit?”    NO    Have you had a mammogram?”   NO    Date of last Mammogram: 6/3/2022              Social History     Substance and Sexual Activity   Sexual Activity Yes    Partners: Male     Medication list reviewed and active medications noted. Patient is taking medications as directed.  See documentation in medication activity.  Allergies: allergy list reviewed, no new allergies added        No questionnaires available.                           Selena Canales LPN        
not have an account, please click on the \"Sign Up Now\" link. Current as of: July 22, 2018  Content Version: 12.1  © 0945-0496 [employer]. Care instructions adapted under license by [facility] ([facility]). If you have questions about a medical condition or this instruction, always ask your healthcare professional. [address] any warranty or liability for your use of this information.

## 2025-06-05 NOTE — ASSESSMENT & PLAN NOTE
- Discussed with patient possible differentials including gout flare, septic arthritis, or cellulitis.  -Plan to increase daily allopurinol and add colchicine and prednisone during acute flare.  -Reviewed lifestyle modifications  -Patient verbalizes understanding not to take her Voltaren at the same time as her steroid.

## 2025-07-17 RX ORDER — TOLTERODINE 4 MG/1
4 CAPSULE, EXTENDED RELEASE ORAL DAILY
Qty: 90 CAPSULE | Refills: 2 | Status: SHIPPED | OUTPATIENT
Start: 2025-07-17

## 2025-07-17 NOTE — TELEPHONE ENCOUNTER
Received a refill request by fax from [facility] at [address] for Tolterodine Tart ER 4 MG Capsules     Message on the fax states: Pt wants to know if you could do refills